# Patient Record
Sex: MALE | Race: WHITE | Employment: OTHER | URBAN - METROPOLITAN AREA
[De-identification: names, ages, dates, MRNs, and addresses within clinical notes are randomized per-mention and may not be internally consistent; named-entity substitution may affect disease eponyms.]

---

## 2022-01-01 ENCOUNTER — APPOINTMENT (OUTPATIENT)
Dept: GENERAL RADIOLOGY | Age: 67
End: 2022-01-01
Payer: COMMERCIAL

## 2022-01-01 ENCOUNTER — HOSPITAL ENCOUNTER (EMERGENCY)
Age: 67
Discharge: HOME | End: 2022-01-02
Attending: EMERGENCY MEDICINE
Payer: COMMERCIAL

## 2022-01-01 DIAGNOSIS — R10.13 ABDOMINAL PAIN, EPIGASTRIC: Primary | ICD-10-CM

## 2022-01-01 LAB
A/G RATIO: 2.1 (ref 1.1–2.2)
ALBUMIN SERPL-MCNC: 4.7 G/DL (ref 3.4–5)
ALP BLD-CCNC: 41 U/L (ref 40–129)
ALT SERPL-CCNC: 16 U/L (ref 10–40)
ANION GAP SERPL CALCULATED.3IONS-SCNC: 10 MMOL/L (ref 3–16)
AST SERPL-CCNC: 23 U/L (ref 15–37)
BASOPHILS ABSOLUTE: 0 K/UL (ref 0–0.2)
BASOPHILS RELATIVE PERCENT: 0.3 %
BILIRUB SERPL-MCNC: 0.3 MG/DL (ref 0–1)
BUN BLDV-MCNC: 14 MG/DL (ref 7–20)
CALCIUM SERPL-MCNC: 8.9 MG/DL (ref 8.3–10.6)
CHLORIDE BLD-SCNC: 101 MMOL/L (ref 99–110)
CO2: 26 MMOL/L (ref 21–32)
CREAT SERPL-MCNC: 0.9 MG/DL (ref 0.8–1.3)
EOSINOPHILS ABSOLUTE: 0.3 K/UL (ref 0–0.6)
EOSINOPHILS RELATIVE PERCENT: 4 %
GFR AFRICAN AMERICAN: >60
GFR NON-AFRICAN AMERICAN: >60
GLUCOSE BLD-MCNC: 125 MG/DL (ref 70–99)
HCT VFR BLD CALC: 44 % (ref 40.5–52.5)
HEMOGLOBIN: 15.5 G/DL (ref 13.5–17.5)
LIPASE: 28 U/L (ref 13–60)
LYMPHOCYTES ABSOLUTE: 2.7 K/UL (ref 1–5.1)
LYMPHOCYTES RELATIVE PERCENT: 33.5 %
MCH RBC QN AUTO: 29.6 PG (ref 26–34)
MCHC RBC AUTO-ENTMCNC: 35.3 G/DL (ref 31–36)
MCV RBC AUTO: 83.9 FL (ref 80–100)
MONOCYTES ABSOLUTE: 0.8 K/UL (ref 0–1.3)
MONOCYTES RELATIVE PERCENT: 10.5 %
NEUTROPHILS ABSOLUTE: 4.1 K/UL (ref 1.7–7.7)
NEUTROPHILS RELATIVE PERCENT: 51.7 %
PDW BLD-RTO: 14 % (ref 12.4–15.4)
PLATELET # BLD: 190 K/UL (ref 135–450)
PMV BLD AUTO: 7.7 FL (ref 5–10.5)
POTASSIUM REFLEX MAGNESIUM: 4.6 MMOL/L (ref 3.5–5.1)
PRO-BNP: 417 PG/ML (ref 0–124)
RBC # BLD: 5.24 M/UL (ref 4.2–5.9)
SODIUM BLD-SCNC: 137 MMOL/L (ref 136–145)
TOTAL PROTEIN: 6.9 G/DL (ref 6.4–8.2)
TROPONIN: <0.01 NG/ML
WBC # BLD: 8 K/UL (ref 4–11)

## 2022-01-01 PROCEDURE — 83880 ASSAY OF NATRIURETIC PEPTIDE: CPT

## 2022-01-01 PROCEDURE — 6360000002 HC RX W HCPCS: Performed by: EMERGENCY MEDICINE

## 2022-01-01 PROCEDURE — 6370000000 HC RX 637 (ALT 250 FOR IP): Performed by: EMERGENCY MEDICINE

## 2022-01-01 PROCEDURE — 80053 COMPREHEN METABOLIC PANEL: CPT

## 2022-01-01 PROCEDURE — 85025 COMPLETE CBC W/AUTO DIFF WBC: CPT

## 2022-01-01 PROCEDURE — 36415 COLL VENOUS BLD VENIPUNCTURE: CPT

## 2022-01-01 PROCEDURE — 71046 X-RAY EXAM CHEST 2 VIEWS: CPT

## 2022-01-01 PROCEDURE — 99283 EMERGENCY DEPT VISIT LOW MDM: CPT

## 2022-01-01 PROCEDURE — 83690 ASSAY OF LIPASE: CPT

## 2022-01-01 PROCEDURE — 93005 ELECTROCARDIOGRAM TRACING: CPT | Performed by: EMERGENCY MEDICINE

## 2022-01-01 PROCEDURE — 84484 ASSAY OF TROPONIN QUANT: CPT

## 2022-01-01 PROCEDURE — 96374 THER/PROPH/DIAG INJ IV PUSH: CPT

## 2022-01-01 RX ORDER — METOPROLOL SUCCINATE 25 MG/1
TABLET, EXTENDED RELEASE ORAL
COMMUNITY
Start: 2021-12-27

## 2022-01-01 RX ORDER — ONDANSETRON 2 MG/ML
4 INJECTION INTRAMUSCULAR; INTRAVENOUS ONCE
Status: COMPLETED | OUTPATIENT
Start: 2022-01-01 | End: 2022-01-01

## 2022-01-01 RX ORDER — ASPIRIN 81 MG/1
324 TABLET, CHEWABLE ORAL ONCE
Status: COMPLETED | OUTPATIENT
Start: 2022-01-01 | End: 2022-01-01

## 2022-01-01 RX ORDER — TADALAFIL 20 MG/1
20 TABLET ORAL DAILY PRN
COMMUNITY
Start: 2021-04-13

## 2022-01-01 RX ORDER — ASPIRIN 81 MG/1
81 TABLET ORAL DAILY
COMMUNITY

## 2022-01-01 RX ADMIN — ONDANSETRON 4 MG: 2 INJECTION INTRAMUSCULAR; INTRAVENOUS at 23:31

## 2022-01-01 RX ADMIN — ASPIRIN 324 MG: 81 TABLET, CHEWABLE ORAL at 23:09

## 2022-01-01 ASSESSMENT — PAIN SCALES - GENERAL: PAINLEVEL_OUTOF10: 6

## 2022-01-01 ASSESSMENT — PAIN DESCRIPTION - PAIN TYPE: TYPE: ACUTE PAIN

## 2022-01-01 ASSESSMENT — ENCOUNTER SYMPTOMS
ABDOMINAL PAIN: 1
SHORTNESS OF BREATH: 0
VOMITING: 0
CHEST TIGHTNESS: 0
RHINORRHEA: 0
FACIAL SWELLING: 0
DIARRHEA: 0
CHOKING: 0
EYE DISCHARGE: 0
NAUSEA: 0
EYE PAIN: 0

## 2022-01-01 ASSESSMENT — PAIN DESCRIPTION - LOCATION: LOCATION: CHEST

## 2022-01-02 ENCOUNTER — APPOINTMENT (OUTPATIENT)
Dept: CT IMAGING | Age: 67
End: 2022-01-02
Payer: COMMERCIAL

## 2022-01-02 VITALS
RESPIRATION RATE: 16 BRPM | HEIGHT: 76 IN | HEART RATE: 57 BPM | TEMPERATURE: 98.2 F | DIASTOLIC BLOOD PRESSURE: 84 MMHG | BODY MASS INDEX: 26.79 KG/M2 | OXYGEN SATURATION: 95 % | WEIGHT: 220 LBS | SYSTOLIC BLOOD PRESSURE: 149 MMHG

## 2022-01-02 LAB
EKG ATRIAL RATE: 62 BPM
EKG DIAGNOSIS: NORMAL
EKG P AXIS: 61 DEGREES
EKG P-R INTERVAL: 178 MS
EKG Q-T INTERVAL: 478 MS
EKG QRS DURATION: 88 MS
EKG QTC CALCULATION (BAZETT): 485 MS
EKG R AXIS: 60 DEGREES
EKG T AXIS: 155 DEGREES
EKG VENTRICULAR RATE: 62 BPM
TROPONIN: <0.01 NG/ML

## 2022-01-02 PROCEDURE — 36415 COLL VENOUS BLD VENIPUNCTURE: CPT

## 2022-01-02 PROCEDURE — 84484 ASSAY OF TROPONIN QUANT: CPT

## 2022-01-02 PROCEDURE — 71275 CT ANGIOGRAPHY CHEST: CPT

## 2022-01-02 PROCEDURE — 6370000000 HC RX 637 (ALT 250 FOR IP): Performed by: EMERGENCY MEDICINE

## 2022-01-02 PROCEDURE — 6360000004 HC RX CONTRAST MEDICATION: Performed by: EMERGENCY MEDICINE

## 2022-01-02 RX ORDER — OMEPRAZOLE 20 MG/1
20 CAPSULE, DELAYED RELEASE ORAL
Qty: 180 CAPSULE | Refills: 1 | Status: SHIPPED | OUTPATIENT
Start: 2022-01-02 | End: 2022-03-23 | Stop reason: ALTCHOICE

## 2022-01-02 RX ADMIN — LIDOCAINE HYDROCHLORIDE: 20 SOLUTION ORAL; TOPICAL at 00:20

## 2022-01-02 RX ADMIN — IOPAMIDOL 80 ML: 755 INJECTION, SOLUTION INTRAVENOUS at 01:08

## 2022-01-02 NOTE — ED TRIAGE NOTES
Pt began experiencing mid chest pain at rest. EKG complete upon pt arrival. Pt hooked up to tele monitoring. MD at the bedside upon pt arrival to room.

## 2022-01-02 NOTE — ED PROVIDER NOTES
[address] [facility]          ATTENDING PHYSICIAN NOTE       Date of evaluation: 1/1/2022    Chief Complaint     inDigestion/chest pain    History of Present Illness     Earnestine Whittington is a 74 y.o. male who presents with a chief complaint of chest pains. Patient states that about an hour and a half ago he had onset of an indigestion feeling in the middle of his chest/upper abdomen. Pain has been constant. Currently moderate, in his epigastrium, no radiation. He took some antacids but that did not help. He does have a history of hypertrophic cardiomyopathy but no history of heart attacks or stents, but given his history of heart problems and the fact that his pain was not going away with antacids he came in to be evaluated. Denies any exertional component, no specific alleviating or exacerbating factors, has never had pain like this before. No diaphoresis or shortness of breath. No history of gallbladder problems. No nausea or vomiting. Review of Systems     Review of Systems   Constitutional: Negative for activity change, appetite change, fatigue and fever. HENT: Negative for ear pain, facial swelling, nosebleeds and rhinorrhea. Eyes: Negative for pain, discharge and visual disturbance. Respiratory: Negative for choking, chest tightness and shortness of breath. Cardiovascular: Positive for chest pain. Negative for palpitations. Gastrointestinal: Positive for abdominal pain. Negative for diarrhea, nausea and vomiting. Endocrine: Negative for cold intolerance, heat intolerance and polyuria. Genitourinary: Negative for dysuria, flank pain and hematuria. Musculoskeletal: Negative for arthralgias, joint swelling and myalgias. Skin: Negative for rash and wound. Allergic/Immunologic: Negative for environmental allergies. Neurological: Negative for dizziness, seizures, syncope, weakness and light-headedness.    Hematological: Does not bruise/bleed easily. Psychiatric/Behavioral: Negative for confusion and hallucinations. Past Medical, Surgical, Family, and Social History     He has a past medical history of Hypertrophic cardiomegaly. He has no past surgical history on file. His family history is not on file. He reports that he has never smoked. He has never used smokeless tobacco. He reports current alcohol use. He reports that he does not use drugs. Medications     Previous Medications    ASPIRIN 81 MG EC TABLET    Take 81 mg by mouth daily    METOPROLOL SUCCINATE (TOPROL XL) 25 MG EXTENDED RELEASE TABLET    TAKE ONE TABLET BY MOUTH DAILY    TADALAFIL (CIALIS) 20 MG TABLET    Take 20 mg by mouth daily as needed       Allergies     He has No Known Allergies. Physical Exam     INITIAL VITALS: BP: (!) 199/101, Temp: 98.2 °F (36.8 °C), Pulse: 58, Resp: 16, SpO2: 100 %   Physical Exam  Constitutional:       General: He is not in acute distress. Appearance: He is well-developed. He is not diaphoretic. HENT:      Head: Normocephalic and atraumatic. Mouth/Throat:      Pharynx: No oropharyngeal exudate. Eyes:      General:         Right eye: No discharge. Left eye: No discharge. Conjunctiva/sclera: Conjunctivae normal.      Pupils: Pupils are equal, round, and reactive to light. Neck:      Thyroid: No thyromegaly. Vascular: No JVD. Trachea: No tracheal deviation. Cardiovascular:      Rate and Rhythm: Normal rate and regular rhythm. Heart sounds: Normal heart sounds. No murmur heard. No friction rub. No gallop. Pulmonary:      Effort: Pulmonary effort is normal. No respiratory distress. Breath sounds: Normal breath sounds. No stridor. No wheezing or rales. Chest:      Chest wall: No tenderness. Abdominal:      General: Bowel sounds are normal. There is no distension. Palpations: Abdomen is soft. Tenderness: There is abdominal tenderness (Epigastric tenderness to palpation).  There is no guarding or rebound. Musculoskeletal:         General: No tenderness or deformity. Normal range of motion. Cervical back: Normal range of motion and neck supple. Skin:     General: Skin is warm and dry. Findings: No erythema or rash. Neurological:      Mental Status: He is alert and oriented to person, place, and time. Cranial Nerves: No cranial nerve deficit. Motor: No abnormal muscle tone. Coordination: Coordination normal.   Psychiatric:         Behavior: Behavior normal.         Diagnostic Results     EKG   Patient: Chest pain. Heart rate 62, intervals normal, axis normal.  No signs of ST elevation or depression but diffuse T wave inversions throughout several leads, although I cannot see prior EKGs I can see prior EKG reads which do state the patient has had diffuse T wave inversions before. Impression: Normal sinus rhythm with likely baseline T wave inversions. Per cardiology note from [facility] : ECG May 2017: NSR, diffuse T wave inversions and increased voltage in mid-precordial leads. RADIOLOGY:  CTA CHEST ABDOMEN PELVIS W CONTRAST   Final Result   1. The thoracic aorta is nondilated. There is no aneurysm or    dissection. 2.  The pulmonary arterial tree is well-opacified with contrast.     No pulmonary embolism is identified. 3.  Trace amount of bibasilar subsegmental atelectasis or scarring,    blurred by motion. No acute appearing airspace infiltrate is    identified. _______________________________________________       IMPRESSION:        1. The abdominal aorta is nondilated. There is no aneurysm or    dissection. 2.  Bowel loops are nondilated. The appendix is normal.  There is    diverticulosis of the lower left and sigmoid colon. No acute    inflammatory changes are seen involving the bowel. 3.  The gallbladder is distended with mildly thickened wall. There are gallstones in the dependent portion of the gallbladder. There is concern for acute cholecystitis, consider ultrasound for    further characterization. XR CHEST (2 VW)   Final Result   1. No acute disease.           LABS:   Results for orders placed or performed during the hospital encounter of 01/01/22   CBC Auto Differential   Result Value Ref Range    WBC 8.0 4.0 - 11.0 K/uL    RBC 5.24 4.20 - 5.90 M/uL    Hemoglobin 15.5 13.5 - 17.5 g/dL    Hematocrit 44.0 40.5 - 52.5 %    MCV 83.9 80.0 - 100.0 fL    MCH 29.6 26.0 - 34.0 pg    MCHC 35.3 31.0 - 36.0 g/dL    RDW 14.0 12.4 - 15.4 %    Platelets 589 510 - 786 K/uL    MPV 7.7 5.0 - 10.5 fL    Neutrophils % 51.7 %    Lymphocytes % 33.5 %    Monocytes % 10.5 %    Eosinophils % 4.0 %    Basophils % 0.3 %    Neutrophils Absolute 4.1 1.7 - 7.7 K/uL    Lymphocytes Absolute 2.7 1.0 - 5.1 K/uL    Monocytes Absolute 0.8 0.0 - 1.3 K/uL    Eosinophils Absolute 0.3 0.0 - 0.6 K/uL    Basophils Absolute 0.0 0.0 - 0.2 K/uL   Comprehensive Metabolic Panel w/ Reflex to MG   Result Value Ref Range    Sodium 137 136 - 145 mmol/L    Potassium reflex Magnesium 4.6 3.5 - 5.1 mmol/L    Chloride 101 99 - 110 mmol/L    CO2 26 21 - 32 mmol/L    Anion Gap 10 3 - 16    Glucose 125 (H) 70 - 99 mg/dL    BUN 14 7 - 20 mg/dL    CREATININE 0.9 0.8 - 1.3 mg/dL    GFR Non-African American >60 >60    GFR African American >60 >60    Calcium 8.9 8.3 - 10.6 mg/dL    Total Protein 6.9 6.4 - 8.2 g/dL    Albumin 4.7 3.4 - 5.0 g/dL    Albumin/Globulin Ratio 2.1 1.1 - 2.2    Total Bilirubin 0.3 0.0 - 1.0 mg/dL    Alkaline Phosphatase 41 40 - 129 U/L    ALT 16 10 - 40 U/L    AST 23 15 - 37 U/L   Lipase   Result Value Ref Range    Lipase 28.0 13.0 - 60.0 U/L   Troponin   Result Value Ref Range    Troponin <0.01 <0.01 ng/mL   Brain Natriuretic Peptide   Result Value Ref Range    Pro- (H) 0 - 124 pg/mL   Troponin   Result Value Ref Range    Troponin <0.01 <0.01 ng/mL       ED BEDSIDE ULTRASOUND:  none    RECENT VITALS:  BP: (!) 149/84,Temp: 98.2 °F (36.8 °C), Pulse: 57, Resp: 16, SpO2: 95 %     Procedures     none    ED Course     Nursing Notes, Past Medical Hx, Past Surgical Hx, Social Hx,Allergies, and Family Hx were reviewed. patient was given the following medications:  Orders Placed This Encounter   Medications    aspirin chewable tablet 324 mg    aluminum & magnesium hydroxide-simethicone (MAALOX) 30 mL, lidocaine viscous hcl (XYLOCAINE) 5 mL (GI COCKTAIL)    ondansetron (ZOFRAN) injection 4 mg    iopamidol (ISOVUE-370) 76 % injection 80 mL    omeprazole (PRILOSEC) 20 MG delayed release capsule     Sig: Take 1 capsule by mouth 2 times daily (before meals)     Dispense:  180 capsule     Refill:  1       CONSULTS:  None    MEDICAL DECISIONMAKING / ASSESSMENT / Matheus Juan Luis is a 74 y.o. male who presents with a chief complaint of chest pain. Initial exam reveals a well-appearing male in no acute distress with hypertension but otherwise normal vitals, afebrile. Physical exam remarkable for mild epigastric tenderness to palpation. Patient with pain in the location that could be caused by heart problems, GERD, gallbladder, pancreas. Broad work-up initiated. EKG with diffuse T wave inversions but on chart review it appears this is likely old based on care everywhere, and patient agrees that he has been told he has T wave inversions before. He even told me before we got the EKG that he \"has a weird EKG \". Troponins negative x2. Patient with full resolution of chest pain after aspirin and GI cocktail. Labs otherwise normal with normal LFTs and lipase, normal white count, normal renal panel. I did obtain a CTA of the chest abdomen pelvis to rule out aortic disease given patient has never had symptoms like this before and he was incredibly hypertensive on arrival.  This was unremarkable other than incidental gallstones. Bedside ultrasound was recommended.   On my bedside ultrasound patient has stones but no signs of cholecystitis. Patient can follow-up with outpatient surgery clinic to discuss if he needs his gallbladder removed. I still think the cause of his symptoms today was acid reflux. He states he has a history of acid reflux. Patient will be discharged with omeprazole and can follow-up with primary care doctor and his cardiologist for outpatient stress test.      Clinical Impression     1. Abdominal pain, epigastric        Disposition     PATIENT REFERRED TO:  Lise Lizarraga, [address] [id number] [phone]    In 1 week  for ED follow up visit    Greg Nichols MD  [address]  [address] [address]  [phone]    In 1 week  for ED follow up visit for gallbladder stones      DISCHARGE MEDICATIONS:  New Prescriptions    OMEPRAZOLE (PRILOSEC) 20 MG DELAYED RELEASE CAPSULE    Take 1 capsule by mouth 2 times daily (before meals)       DISPOSITION discharge home       Frank Matos MD  01/02/22 [id number]

## 2022-01-02 NOTE — ED NOTES
Pt has d/c order. D/C instructions given. Prescriptions given. Pt verbalized understanding. Pt out to lobby.          Shelli Sandoval RN  01/02/22 [id number]

## 2022-03-23 ENCOUNTER — HOSPITAL ENCOUNTER (EMERGENCY)
Age: 67
Discharge: HOME | End: 2022-03-23
Attending: EMERGENCY MEDICINE
Payer: MEDICARE

## 2022-03-23 VITALS
SYSTOLIC BLOOD PRESSURE: 154 MMHG | TEMPERATURE: 98 F | RESPIRATION RATE: 16 BRPM | OXYGEN SATURATION: 99 % | DIASTOLIC BLOOD PRESSURE: 89 MMHG | HEART RATE: 61 BPM

## 2022-03-23 DIAGNOSIS — K40.91 UNILATERAL RECURRENT INGUINAL HERNIA WITHOUT OBSTRUCTION OR GANGRENE: Primary | ICD-10-CM

## 2022-03-23 PROCEDURE — 99284 EMERGENCY DEPT VISIT MOD MDM: CPT

## 2022-03-23 ASSESSMENT — PAIN DESCRIPTION - ORIENTATION: ORIENTATION: LEFT;LOWER

## 2022-03-23 ASSESSMENT — PAIN SCALES - GENERAL: PAINLEVEL_OUTOF10: 4

## 2022-03-23 ASSESSMENT — PAIN DESCRIPTION - FREQUENCY: FREQUENCY: CONTINUOUS

## 2022-03-23 ASSESSMENT — PAIN - FUNCTIONAL ASSESSMENT
PAIN_FUNCTIONAL_ASSESSMENT: ACTIVITIES ARE NOT PREVENTED
PAIN_FUNCTIONAL_ASSESSMENT: 0-10

## 2022-03-23 ASSESSMENT — PAIN DESCRIPTION - PAIN TYPE: TYPE: ACUTE PAIN

## 2022-03-23 ASSESSMENT — PAIN DESCRIPTION - DESCRIPTORS: DESCRIPTORS: TENDER

## 2022-03-23 ASSESSMENT — PAIN DESCRIPTION - LOCATION: LOCATION: GROIN;ABDOMEN

## 2022-03-23 ASSESSMENT — PAIN DESCRIPTION - ONSET: ONSET: SUDDEN

## 2022-03-23 ASSESSMENT — PAIN DESCRIPTION - PROGRESSION: CLINICAL_PROGRESSION: GRADUALLY IMPROVING

## 2022-03-23 NOTE — ED PROVIDER NOTES
ED Attending Attestation Note     Date of evaluation: 3/23/2022    This patient was seen by the advance practice provider. I have seen and examined the patient, agree with the workup, evaluation, management and diagnosis. The care plan has been discussed. My assessment reveals patient with left inguinal hernia that spontaneously reduced after passing flatus. Benign exam, will refer to general surgeon.      Jennifer Talbert MD  03/23/22 1021

## 2022-03-23 NOTE — ED PROVIDER NOTES
[address] ENCOUNTER          PHYSICIAN ASSISTANT NOTE     Date of evaluation: 3/23/2022    Chief Complaint     Groin Pain (left inguinal hernia)      History of Present Illness     Teddy Fonseca is a 74 y.o. male with a history of hypertrophic cardiomegaly presents today for evaluation of left inguinal pain and swelling. Patient states he was tentatively diagnosed with an inguinal hernia last week by his primary care doctor. This morning, he awoke and was making coffee when he felt a bulging sensation in his left groin with some associated sharp pain. The pain was initially rated 8/10 and associated nausea. He he endorsed a severe accompanying sensation of the need to pass gas or have a bowel movement. He considered his return precautions provided by his primary care doctor for possible incarcerated/strangulated hernia and presents here for evaluation of such. While in the lobby, patient states he passed gas, the bulge significantly decreased in size, and his pain is now minimal.  He specifically denies any upper abdominal pain, swelling of the testicles, pain in the scrotum, pain with defecation, bloody defecation, bloody urine, burning with urination, penile discharge, constipation. He denies any history of abdominal surgeries. Review of Systems     A complete review of systems was performed and negative except as stated in the HPI. Past Medical, Surgical, Family, and Social History     He has a past medical history of Hypertrophic cardiomegaly and Inguinal hernia. He has no past surgical history on file. His family history is not on file. He reports that he has never smoked. He has never used smokeless tobacco. He reports current alcohol use. He reports that he does not use drugs.     Medications     Previous Medications    ASPIRIN 81 MG EC TABLET    Take 81 mg by mouth daily    METOPROLOL SUCCINATE (TOPROL XL) 25 MG EXTENDED RELEASE TABLET    TAKE ONE TABLET BY MOUTH DAILY    TADALAFIL (CIALIS) 20 MG TABLET    Take 20 mg by mouth daily as needed       Allergies     He is allergic to atorvastatin. Physical Exam     INITIAL VITALS: BP: (!) 154/89, Temp: 98 °F (36.7 °C), Pulse: 61, Resp: 16, SpO2: 99 %     General: Well appearing, well nourished, in no apparent state of distress. HEENT:  Normocephalic, atraumatic. Pupils equal, sclera white. Handling secretions without difficulty. Neck: No meningismus. Trachea midline    Pulmonary: Respirations even. Non labored. No tachypnea. Cardiac: Chest symmetrical and non-tender on palpation of chest wall. Abdomen:  Non-distended. Non rigid and non tender to palpation. There is slight fullness overlying the left inguinal canal without palpable bowel or reducible hernia. : No pain, swelling, or palpable bowel loop in the scrotum. Testicles are equal in size and in equal lie. Musculoskeletal:  Ambulates under own control. Neuro:  Alert and oriented x 3. CN II - XII grossly intact. Moves all extremities spontaneously. Vascular:  2+ peripheral pulses in bilateral upper and lower extremities      Skin:  Warm and well perfused without rashes or lesions    Psych:  Appropriate mood and affect        Diagnostic Results     EKG       RADIOLOGY:  No orders to display       LABS:   No results found for this visit on 03/23/22. ED BEDSIDE ULTRASOUND:      RECENT VITALS:  BP: (!) 154/89, Temp: 98 °F (36.7 °C), Pulse: 61, Resp: 16, SpO2: 99 %     Procedures         ED Course     Nursing Notes, Past Medical Hx, Past Surgical Hx, Social Hx, Allergies, and Family Hx were reviewed. The patient was given the following medications:  No orders of the defined types were placed in this encounter.       CONSULTS:  None    MEDICAL DECISION MAKING / ASSESSMENT / PLAN     Zakia Deal is a 74 y.o. male presents today for evaluation of recurrent left-sided inguinal pain for which he is concerned about a possible strangulated or incarcerated hernia. The pain resolved spontaneously while waiting in the lobby and passing flatulence. On arrival, I observe a well-appearing, pleasant, humorous male. His abdomen is soft without rebound tenderness. There is palpable fullness over the left inguinal canal without palpable bowel loop or reducible hernia. No swelling or tenderness of the testicles/scrotum. Based on his symptoms he likely has a spontaneously reducing inguinal hernia. Today, it does not appear to be incarcerated or strangulated considering his now lack of pain. I have a low suspicion for accompanying testicular torsion, varicocele, hydrocele, epididymitis, prostatitis, urethritis, urinary tract infection, diverticulitis, bowel obstruction, or appendicitis. He was instructed on strict return precautions should this recur and become associated with intractable pain. A referral to general surgery was placed. He was discharged in good condition. This patient was also evaluated by the attending physician. All care plans were discussed and agreed upon. Some of this note was dictated using voice recognition software. As a result, unintended errors in grammar or spelling may exist.    Clinical Impression     1. Unilateral recurrent inguinal hernia without obstruction or gangrene        Disposition     PATIENT REFERRED TO:  Jv Lea MD  [address]  [address] [place] [address]  [phone]    Schedule an appointment as soon as possible for a visit       Jammie Hebert MD  [address] [facility] [id number] [phone]    Schedule an appointment as soon as possible for a visit   As needed      DISCHARGE MEDICATIONS:  New Prescriptions    No medications on file       DISPOSITION Decision To Discharge 03/23/2022 10:19:11 AM        Mali Sherwood PA-C  03/23/22 [id number]

## 2022-03-25 ENCOUNTER — HOSPITAL ENCOUNTER (EMERGENCY)
Age: 67
Discharge: HOME | End: 2022-03-25
Attending: STUDENT IN AN ORGANIZED HEALTH CARE EDUCATION/TRAINING PROGRAM
Payer: MEDICARE

## 2022-03-25 VITALS
OXYGEN SATURATION: 100 % | SYSTOLIC BLOOD PRESSURE: 147 MMHG | TEMPERATURE: 98.7 F | HEART RATE: 55 BPM | DIASTOLIC BLOOD PRESSURE: 85 MMHG | RESPIRATION RATE: 16 BRPM

## 2022-03-25 DIAGNOSIS — K40.91 UNILATERAL RECURRENT INGUINAL HERNIA WITHOUT OBSTRUCTION OR GANGRENE: Primary | ICD-10-CM

## 2022-03-25 LAB
ALBUMIN SERPL-MCNC: 4.4 G/DL (ref 3.4–5)
ALP BLD-CCNC: 37 U/L (ref 40–129)
ALT SERPL-CCNC: 17 U/L (ref 10–40)
ANION GAP SERPL CALCULATED.3IONS-SCNC: 9 MMOL/L (ref 3–16)
AST SERPL-CCNC: 18 U/L (ref 15–37)
BASOPHILS ABSOLUTE: 0.1 K/UL (ref 0–0.2)
BASOPHILS RELATIVE PERCENT: 1.1 %
BILIRUB SERPL-MCNC: 0.8 MG/DL (ref 0–1)
BILIRUBIN DIRECT: <0.2 MG/DL (ref 0–0.3)
BILIRUBIN URINE: NEGATIVE
BILIRUBIN, INDIRECT: ABNORMAL MG/DL (ref 0–1)
BLOOD, URINE: NEGATIVE
BUN BLDV-MCNC: 14 MG/DL (ref 7–20)
CALCIUM SERPL-MCNC: 9.3 MG/DL (ref 8.3–10.6)
CHLORIDE BLD-SCNC: 101 MMOL/L (ref 99–110)
CLARITY: CLEAR
CO2: 27 MMOL/L (ref 21–32)
COLOR: YELLOW
CREAT SERPL-MCNC: 0.9 MG/DL (ref 0.8–1.3)
EOSINOPHILS ABSOLUTE: 0.4 K/UL (ref 0–0.6)
EOSINOPHILS RELATIVE PERCENT: 5 %
GFR AFRICAN AMERICAN: >60
GFR NON-AFRICAN AMERICAN: >60
GLUCOSE BLD-MCNC: 132 MG/DL (ref 70–99)
GLUCOSE URINE: NEGATIVE MG/DL
HCT VFR BLD CALC: 42.8 % (ref 40.5–52.5)
HEMOGLOBIN: 15 G/DL (ref 13.5–17.5)
KETONES, URINE: NEGATIVE MG/DL
LACTIC ACID: 1.1 MMOL/L (ref 0.4–2)
LEUKOCYTE ESTERASE, URINE: ABNORMAL
LIPASE: 18 U/L (ref 13–60)
LYMPHOCYTES ABSOLUTE: 1.7 K/UL (ref 1–5.1)
LYMPHOCYTES RELATIVE PERCENT: 21.1 %
MCH RBC QN AUTO: 29.6 PG (ref 26–34)
MCHC RBC AUTO-ENTMCNC: 35.1 G/DL (ref 31–36)
MCV RBC AUTO: 84.4 FL (ref 80–100)
MICROSCOPIC EXAMINATION: YES
MONOCYTES ABSOLUTE: 0.7 K/UL (ref 0–1.3)
MONOCYTES RELATIVE PERCENT: 8.5 %
MUCUS: ABNORMAL /LPF
NEUTROPHILS ABSOLUTE: 5.3 K/UL (ref 1.7–7.7)
NEUTROPHILS RELATIVE PERCENT: 64.3 %
NITRITE, URINE: NEGATIVE
PDW BLD-RTO: 14.2 % (ref 12.4–15.4)
PH UA: 6 (ref 5–8)
PLATELET # BLD: 184 K/UL (ref 135–450)
PMV BLD AUTO: 7.8 FL (ref 5–10.5)
POTASSIUM REFLEX MAGNESIUM: 4.3 MMOL/L (ref 3.5–5.1)
PROTEIN UA: NEGATIVE MG/DL
RBC # BLD: 5.08 M/UL (ref 4.2–5.9)
RBC UA: ABNORMAL /HPF (ref 0–4)
SODIUM BLD-SCNC: 137 MMOL/L (ref 136–145)
SPECIFIC GRAVITY UA: >=1.03 (ref 1–1.03)
TOTAL PROTEIN: 7 G/DL (ref 6.4–8.2)
URINE TYPE: ABNORMAL
UROBILINOGEN, URINE: 0.2 E.U./DL
WBC # BLD: 8.2 K/UL (ref 4–11)
WBC UA: ABNORMAL /HPF (ref 0–5)

## 2022-03-25 PROCEDURE — 85025 COMPLETE CBC W/AUTO DIFF WBC: CPT

## 2022-03-25 PROCEDURE — 81001 URINALYSIS AUTO W/SCOPE: CPT

## 2022-03-25 PROCEDURE — 80076 HEPATIC FUNCTION PANEL: CPT

## 2022-03-25 PROCEDURE — 80048 BASIC METABOLIC PNL TOTAL CA: CPT

## 2022-03-25 PROCEDURE — 83605 ASSAY OF LACTIC ACID: CPT

## 2022-03-25 PROCEDURE — 99282 EMERGENCY DEPT VISIT SF MDM: CPT

## 2022-03-25 PROCEDURE — 83690 ASSAY OF LIPASE: CPT

## 2022-03-25 ASSESSMENT — PAIN DESCRIPTION - FREQUENCY: FREQUENCY: CONTINUOUS

## 2022-03-25 ASSESSMENT — PAIN DESCRIPTION - DESCRIPTORS: DESCRIPTORS: BURNING

## 2022-03-25 ASSESSMENT — PAIN SCALES - GENERAL: PAINLEVEL_OUTOF10: 2

## 2022-03-25 ASSESSMENT — PAIN DESCRIPTION - ORIENTATION: ORIENTATION: LEFT

## 2022-03-25 ASSESSMENT — PAIN DESCRIPTION - LOCATION: LOCATION: GROIN

## 2022-03-25 ASSESSMENT — PAIN DESCRIPTION - PAIN TYPE: TYPE: ACUTE PAIN

## 2022-03-25 NOTE — ED NOTES
Pt given dc instructions, verbalized understanding. Pt educated on follow up appt, verbalized understanding. Pt has no further questions or concerns at this time. Pt in no signs of distress. Pt has steady gait to lobby.         Theodore Davis RN  03/25/22 4753

## 2022-03-25 NOTE — ED PROVIDER NOTES
[address] [facility]          ATTENDING PHYSICIAN NOTE       Date of evaluation: 3/25/2022    Chief Complaint     Groin Pain (left groin pain/swelling at hernia site appt on monday with dr Estephania Hodges)      History of Present Illness     Claudia Ontiveros is a 74 y.o. male with a hx inguinal hernia, hypertrophic cardiomegaly who presents with groin pain    Patient was recently seen in this emergency department approximately 2 days ago. At that time he presented with concern for left groin fullness that resolved at the time he was evaluated by the providers. Given this there was concern for a spontaneously reducing hernia without indication for further emergent or urgent evaluation. Today, the patient returns with recurrent pain    Patient awoke today pain free. He began his usual daily activities. Then, he noticed pain. Pain is described as in the left groin region, moderate in severity, burning in quality, constant in its course since onset about 2 hours ago for about 90 minutes, and then much improved over last 10-15 minutes since lying in the stretcher in a semi-holm's position. When had the pain, it was worsened by palpation or movement. He did not notice warmth or redness to the area but could palpate a fullness in the left groin. He denies any dysuria, hematuria, urinary frequency. He has not had any radiation of the pain to the scrotum or any scrotal pain. He had a bowel movement yesterday which was normal typical in appearance from although it is slightly light in color compared to typical.  Certainly no melena or hematochezia. He had some mild nausea this morning associated with the pain but no vomiting. The nausea is completely resolved. He has otherwise been in his usual state of health and denies any recent fevers. PMHx: HCM, inguinal hernia, and as below  SH: , nonsmoker, and as below    Review of Systems       ROS:   Positive  as per HPI.   Negative for:    -Constitutional: fevers, chills    -Eyes:   eye pain, eye discharge    -Ears/Nose/Throat: Ear pain, ear discharge    -Cardiovascular: CP, cyanosis    -Respiratory:  cough, SOB    -Gastrointestinal: vomiting, melena, hematochezia    -Genitourinary: hematuria, dysuria, urinary frequency    -Neurological: numbness or weakness    -Skin:   Rash, pruritis,     -Hematologic: easy bleeding, easy bruising    -Musculoskeletal:  joint swelling, joint redness    Past Medical, Surgical, Family, and Social History     He has a past medical history of Hypertrophic cardiomegaly and Inguinal hernia. He has no past surgical history on file. His family history is not on file. He reports that he has never smoked. He has never used smokeless tobacco. He reports current alcohol use. He reports that he does not use drugs. Medications     Discharge Medication List as of 3/25/2022 12:16 PM      CONTINUE these medications which have NOT CHANGED    Details   metoprolol succinate (TOPROL XL) 25 MG extended release tablet TAKE ONE TABLET BY MOUTH DAILYHistorical Med      aspirin 81 MG EC tablet Take 81 mg by mouth dailyHistorical Med      tadalafil (CIALIS) 20 MG tablet Take 20 mg by mouth daily as neededHistorical Med             Allergies     He is allergic to atorvastatin. Physical Exam     INITIAL VITALS: BP: (!) 147/85, Temp: 98.7 °F (37.1 °C), Pulse: 55, Resp: 16, SpO2: 100 %     General:  Well appearing. No acute distress. Non-toxic appearing    Eyes:  Pupils equally round, reactive, brisk. No discharge from eyes. ENT:  No discharge from nose. OP clear. Neck:  Supple. Nontender. Pulmonary:   Non-labored breathing. Breath sounds clear bilaterally. Cardiac:  Regular rate and rhythm. No murmurs. Abdomen:  Soft. Non-tender throughout. Non-distended. No masses. No CVAT. : Normal external male genitalia. The scrotum is soft and free of tenderness palpation. Testes are palpable with normal lie.   The testes are nontender. There is no overlying scrotal edema or skin thickening. There is no warmth or erythema. The entire groin is free of any rash or warmth. There is no lymphadenopathy palpable. Patient has no palpable hernia or fullness in the left groin. Musculoskeletal:  No long bone deformity. No ankle or wrist deformity. Vascular:  Extremities warm and perfused. Skin:  No rash. Warm. Neuro: Alert and oriented x 3. CN II-XII grossly intact. Speech and mentation normal.    5/5 strength in all 4 extremities by finger  and ankle dorsi/plantar flexion. Sensation grossly intact to light touch. Gait narrow and stable, not ataxic. Extremities:  No peripheral edema. LE symmetric.     Diagnostic Results     EKG   None indicated    RADIOLOGY:  No orders to display       LABS:   Results for orders placed or performed during the hospital encounter of 03/25/22   CBC with Auto Differential   Result Value Ref Range    WBC 8.2 4.0 - 11.0 K/uL    RBC 5.08 4.20 - 5.90 M/uL    Hemoglobin 15.0 13.5 - 17.5 g/dL    Hematocrit 42.8 40.5 - 52.5 %    MCV 84.4 80.0 - 100.0 fL    MCH 29.6 26.0 - 34.0 pg    MCHC 35.1 31.0 - 36.0 g/dL    RDW 14.2 12.4 - 15.4 %    Platelets 250 159 - 983 K/uL    MPV 7.8 5.0 - 10.5 fL    Neutrophils % 64.3 %    Lymphocytes % 21.1 %    Monocytes % 8.5 %    Eosinophils % 5.0 %    Basophils % 1.1 %    Neutrophils Absolute 5.3 1.7 - 7.7 K/uL    Lymphocytes Absolute 1.7 1.0 - 5.1 K/uL    Monocytes Absolute 0.7 0.0 - 1.3 K/uL    Eosinophils Absolute 0.4 0.0 - 0.6 K/uL    Basophils Absolute 0.1 0.0 - 0.2 K/uL   Basic Metabolic Panel w/ Reflex to MG   Result Value Ref Range    Sodium 137 136 - 145 mmol/L    Potassium reflex Magnesium 4.3 3.5 - 5.1 mmol/L    Chloride 101 99 - 110 mmol/L    CO2 27 21 - 32 mmol/L    Anion Gap 9 3 - 16    Glucose 132 (H) 70 - 99 mg/dL    BUN 14 7 - 20 mg/dL    CREATININE 0.9 0.8 - 1.3 mg/dL    GFR Non-African American >60 >60    GFR African American >60 >60    Calcium 9.3 8.3 - 10.6 mg/dL   Hepatic Function Panel   Result Value Ref Range    Total Protein 7.0 6.4 - 8.2 g/dL    Albumin 4.4 3.4 - 5.0 g/dL    Alkaline Phosphatase 37 (L) 40 - 129 U/L    ALT 17 10 - 40 U/L    AST 18 15 - 37 U/L    Total Bilirubin 0.8 0.0 - 1.0 mg/dL    Bilirubin, Direct <0.2 0.0 - 0.3 mg/dL    Bilirubin, Indirect see below 0.0 - 1.0 mg/dL   Lipase   Result Value Ref Range    Lipase 18.0 13.0 - 60.0 U/L   Urinalysis with Microscopic   Result Value Ref Range    Color, UA Yellow Straw/Yellow    Clarity, UA Clear Clear    Glucose, Ur Negative Negative mg/dL    Bilirubin Urine Negative Negative    Ketones, Urine Negative Negative mg/dL    Specific Gravity, UA >=1.030 1.005 - 1.030    Blood, Urine Negative Negative    pH, UA 6.0 5.0 - 8.0    Protein, UA Negative Negative mg/dL    Urobilinogen, Urine 0.2 <2.0 E.U./dL    Nitrite, Urine Negative Negative    Leukocyte Esterase, Urine TRACE (A) Negative    Microscopic Examination YES     Urine Type NotGiven     Mucus, UA 1+ (A) None Seen /LPF    WBC, UA 0-2 0 - 5 /HPF    RBC, UA None seen 0 - 4 /HPF   Lactic Acid   Result Value Ref Range    Lactic Acid 1.1 0.4 - 2.0 mmol/L       ED BEDSIDE ULTRASOUND:  None performed    Procedures     None performed    ED Course     Nursing Notes, Past Medical Hx, Past Surgical Hx, Social Hx, Allergies, and Family Hx were reviewed. The patient was given the following medications:  No orders of the defined types were placed in this encounter. CONSULTS:  [address] [facility] DECISIONMAKING / ASSESSMENT / PLAN     Macie Velazquez is a 74 y.o. male with groin pain. Pt was hemodynamically stable and afebrile in the Emergency Department. The patient presents with pain that appears to be likely related to a spontaneously reducible hernia. He is pain-free on my exam.  Given this I have a lower suspicion for incarcerated or strangulated hernia.   His testicles ands scrotum are unremarkable in appearance and a low suspicion for testicular torsion and certainly a very low suspicion for necrotizing fasciitis. There is no sign of lymphadenopathy. There are no signs of epididymitis. His signs and symptoms do not suggest urethritis urinary tract infection, pyelonephritis, ureterolithiasis. Given the left-sided pain at a lower suspicion for appendicitis. Given he is passing stools and has not been vomiting and is passing gas, I have a low suspicion for bowel obstruction. Given the left-sided pain I did consider in the differential diverticulitis. However, the patient has been afebrile and his left lower quadrant is nontender to palpation today. Given his recurrent visit however, I will obtain screening laboratory studies. These returned reassuring. CBC with no leukocytosis, making systemic infection somewhat less likely. There is no significant new anemia. Basic metabolic panel without evidence of acute kidney injury or significant electrolyte derangement. Hepatic function panel unremarkable making acute hepatitis or biliary pathology less likely. Lipase is not elevated making pancreatitis unlikely. Urinalysis unremarkable and free of evidence of acute urinary tract infection. Lactate not elevated making systemic infection and hypoperfusion somewhat less likely. Discussed with the patient who requested consideration for possible more urgent surgical intervention given the recurrent pain episode even though it has now resolved. I explained that in the absence of ongoing symptoms he was unlikely to have an incarcerated or strangled hernia and was suitable for outpatient follow-up but agreed that given his recurrent ED visit we would touch base with surgery. Surgery evaluated the patient and recommended outpatient follow-up which she already has arranged. Patient was agreeable with this plan. At this time patient be discharged. Clinical Impression     1.  Unilateral recurrent inguinal hernia without obstruction or gangrene        Disposition     PATIENT REFERRED TO:  Salomon Jackson MD  [address] [facility] [address] [phone]    Schedule an appointment as soon as possible for a visit   As needed    Damari Romero MD  [address] [address] [address]  [phone]    Go on 3/28/2022  Discuss your ED visit, and referrals/medication      DISCHARGE MEDICATIONS:  Discharge Medication List as of 3/25/2022 12:16 PM          DISPOSITION    Decision to discharge.      Kathi Osman MD  03/25/22 [id number]

## 2022-03-25 NOTE — CONSULTS
Department of General Surgery - Adult  Surgical Service   Consult Note      Reason for Consult:  Inguinal hernia  Requesting Physician: Dr. Meggan Taylor:  Groin pain    History Obtained From:  patient, electronic medical record    HISTORY OF PRESENT ILLNESS:    The patient is a 74 y.o. male who presents with left inguinal pain and swelling. The first episode was on Wednesday and he presented to the ER. He was seen and instructed to follow up with surgery. The first available appointment was on 3/28. He was doing fine and then this morning he woke up and the pain and swelling restarted. This time the pain was worse and he had some nausea with it. His last meal was last night. He denies fever, chills, SOB, emesis. He currently feels better. He no longer is having any symptoms. Past Medical History:        Diagnosis Date    Hypertrophic cardiomegaly     Inguinal hernia     left     Past Surgical History:    No past surgical history on file. Current Medications:   No current facility-administered medications for this encounter. Allergies:  Atorvastatin    Social History:   TOBACCO:  Never used tobacco  ETOH:  social  DRUGS:  Never used recreational drugs  Family History:   No family history on file.     REVIEW OF SYSTEMS:    CONSTITUTIONAL:  negative for  fevers, chills and sweats  RESPIRATORY:  negative for  dyspnea, wheezing and chest pain  CARDIOVASCULAR:  negative for  chest pain, dyspnea, palpitations  GASTROINTESTINAL:  positive for nausea, negative for emesis  GENITOURINARY:  Left groin pain and swelling    PHYSICAL EXAM:    VITALS:  BP (!) 147/85   Pulse 55   Resp 16   SpO2 100%   24HR INTAKE/OUTPUT:  No intake or output data in the 24 hours ending 03/25/22 0959  CONSTITUTIONAL:  awake, alert, cooperative, no apparent distress, and appears stated age  LUNGS:  No increased work of breathing, good air exchange, no crackles or wheezing  CARDIOVASCULAR:  RRR  ABDOMEN:  Soft and round, non-distended  GENITAL/URINARY:  Left groin inguinal hernia palpated with no pain to palpation, no swelling, no eryhtema  SKIN:  no bruising or bleeding  DATA:    CBC:   Lab Results   Component Value Date    WBC 8.2 03/25/2022    RBC 5.08 03/25/2022    HGB 15.0 03/25/2022    HCT 42.8 03/25/2022    MCV 84.4 03/25/2022    MCH 29.6 03/25/2022    MCHC 35.1 03/25/2022    RDW 14.2 03/25/2022     03/25/2022    MPV 7.8 03/25/2022     CMP:    Lab Results   Component Value Date     03/25/2022    K 4.3 03/25/2022     03/25/2022    CO2 27 03/25/2022    BUN 14 03/25/2022    CREATININE 0.9 03/25/2022    GFRAA >60 03/25/2022    GFRAA >60 06/29/2012    AGRATIO 2.1 01/01/2022    LABGLOM >60 03/25/2022    GLUCOSE 132 03/25/2022    PROT 7.0 03/25/2022    PROT 7.4 06/29/2012    LABALBU 4.4 03/25/2022    CALCIUM 9.3 03/25/2022    BILITOT 0.8 03/25/2022    ALKPHOS 37 03/25/2022    AST 18 03/25/2022    ALT 17 03/25/2022     BMP:    Lab Results   Component Value Date     03/25/2022    K 4.3 03/25/2022     03/25/2022    CO2 27 03/25/2022    BUN 14 03/25/2022    LABALBU 4.4 03/25/2022    CREATININE 0.9 03/25/2022    CALCIUM 9.3 03/25/2022    GFRAA >60 03/25/2022    GFRAA >60 06/29/2012    LABGLOM >60 03/25/2022    GLUCOSE 132 03/25/2022     Hepatic Function Panel:    Lab Results   Component Value Date    ALKPHOS 37 03/25/2022    ALT 17 03/25/2022    AST 18 03/25/2022    PROT 7.0 03/25/2022    PROT 7.4 06/29/2012    BILITOT 0.8 03/25/2022    BILIDIR <0.2 03/25/2022    IBILI see below 03/25/2022    LABALBU 4.4 03/25/2022     PT/INR:  No results found for: PROTIME, INR  U/A:    Lab Results   Component Value Date    COLORU Yellow 03/25/2022    PROTEINU Negative 03/25/2022    PHUR 6.0 03/25/2022    WBCUA 0-2 03/25/2022    RBCUA None seen 03/25/2022    MUCUS 1+ 03/25/2022    CLARITYU Clear 03/25/2022    SPECGRAV >=1.030 03/25/2022    LEUKOCYTESUR TRACE 03/25/2022    UROBILINOGEN 0.2 03/25/2022    BILIRUBINUR Negative 03/25/2022    BLOODU Negative 03/25/2022    GLUCOSEU Negative 03/25/2022     Imaging:   CTA CAP 1/2/2022  Bilateral fat containing inguinal hernias noted after personally reviewing imaging    IMPRESSION/RECOMMENDATIONS:    74year old male with history of hypertrophic cardiomegaly who was recently seen by PCP for groin pain and was referred to surgery for inguinal hernia. Patient with symptoms this morning which have since resolved. CT from January reviewed which shows bilateral fat containing inguinal hernias. Due to symptomatic nature of his left sided hernia recommend repair of both hernias. Patient is scheduled to follow up with Dr. Nayan Oliveira on Monday. Will discuss surgery in more detail on Monday. Patient educated on symptoms of hernia and complications associated with hernia for which he should seek further medical care. All questions answered. Okay to discharge from general surgery standpoint.      Curly Simon DO  PGY-5 General Surgery  03/25/22  11:32 AM  [phone]

## 2022-03-28 ENCOUNTER — OFFICE VISIT (OUTPATIENT)
Dept: SURGERY | Age: 67
End: 2022-03-28
Payer: MEDICARE

## 2022-03-28 VITALS
WEIGHT: 225 LBS | HEART RATE: 56 BPM | SYSTOLIC BLOOD PRESSURE: 134 MMHG | HEIGHT: 72 IN | BODY MASS INDEX: 30.48 KG/M2 | DIASTOLIC BLOOD PRESSURE: 73 MMHG | OXYGEN SATURATION: 98 %

## 2022-03-28 DIAGNOSIS — K40.90 LEFT INGUINAL HERNIA: Primary | ICD-10-CM

## 2022-03-28 PROCEDURE — 1036F TOBACCO NON-USER: CPT | Performed by: SURGERY

## 2022-03-28 PROCEDURE — G8427 DOCREV CUR MEDS BY ELIG CLIN: HCPCS | Performed by: SURGERY

## 2022-03-28 PROCEDURE — G8484 FLU IMMUNIZE NO ADMIN: HCPCS | Performed by: SURGERY

## 2022-03-28 PROCEDURE — 99204 OFFICE O/P NEW MOD 45 MIN: CPT | Performed by: SURGERY

## 2022-03-28 PROCEDURE — 4040F PNEUMOC VAC/ADMIN/RCVD: CPT | Performed by: SURGERY

## 2022-03-28 PROCEDURE — 1123F ACP DISCUSS/DSCN MKR DOCD: CPT | Performed by: SURGERY

## 2022-03-28 PROCEDURE — G8417 CALC BMI ABV UP PARAM F/U: HCPCS | Performed by: SURGERY

## 2022-03-28 PROCEDURE — 3017F COLORECTAL CA SCREEN DOC REV: CPT | Performed by: SURGERY

## 2022-03-28 NOTE — LETTER
P - [facility]  [address]  [address] [facility]. [facility], [address]  Phone: [phone] Fax: [phone]            Surgery Order/Time:  3/28/22/4:08 PM  Conf. # _________________  Scheduled by: Marcelino Fritz Lpn                               **[facility] in [facility]**  Facility: [facility].    Surgery Date: 2022 Time: 11:00am    Pt arrival: 0900  Second Surgeon: N/A        Patient Name: Luis Deshpande  : 1955  Home Ph:[phone] (home)  [facility] [facility] [address]  PCP:  Lakeisha Danielle MD   Does patient speak English?: yes    needed? no                                             PROCEDURE: Laparoscopic left inguinal hernia possible right  CPT: 16228: Laparoscopic Inguinal Hernia Repair    DIAGNOSIS:      ICD-10-CM    1. Left inguinal hernia  K40.90        Anesthesia: General  Time Needed:  1 hour   Pt Position:  supine      Outpatient __x__ Admit ______  Assist._____   Cardiac Clearance: no  Patient to meet with Anesthesiology prior to surgery: no    Patient Allergies: Allergies   Allergen Reactions    Atorvastatin      Muscle pain  Muscle pain       Pre-Op H&P to be done by: Lakeisha Danielle MD  Pre-Op Antibiotics: Ancef: 2g IV On Call to OR      Physician: Samantha Persaud MD Date: 22             Insurance:     ID #      Ph #   Date called ________________   Princess [place] to: _____________ Precert Needed?  Yes  /  No  PreAuth # & Details   ______________________________________________________________________

## 2022-03-30 ENCOUNTER — TELEPHONE (OUTPATIENT)
Dept: SURGERY | Age: 67
End: 2022-03-30

## 2022-03-30 NOTE — TELEPHONE ENCOUNTER
Patient seen on 3/28/22 surgery letter received Laparoscopic left inguinal hernia possible right 1Hr OR time needed under general     Covid vaccinated     Pre op instructions reviewed including the need for the need of a H&P with a EXG. Pre op packet emailed to [email or web address]    Post op appt scheduled     Faxed to scheduling     [facility] [place]

## 2022-03-30 NOTE — PROGRESS NOTES
Place patient label inside box (if no patient label, complete below)  Name:  :  MR#:   Miles Jones / PROCEDURE  1. I (we), Emelia Vernon (Patient Name) authorize Donna Seth MD (Provider / Aidan Austin) and/or such assistants as may be selected by him/her, to perform the following operation/procedure(s): LAPAROSCOPIC LEFT INGUINAL HERNIA REPAIR POSSIBLE RIGHT       Note: If unable to obtain consent prior to an emergent procedure, document the emergent reason in the medical record. This procedure has been explained to my (our) satisfaction and included in the explanation was:  A) The intended benefit, nature, and extent of the procedure to be performed;  B) The significant risks involved and the probability of success;  C) Alternative procedures and methods of treatment;  D) The dangers and probable consequences of such alternatives (including no procedure or treatment); E) The expected consequences of the procedure on my future health;  F) Whether other qualified individuals would be performing important surgical tasks and/or whether  would be present to advise or support the procedure. I (we) understand that there are other risks of infection and other serious complications in the pre-operative/procedural and postoperative/procedural stages of my (our) care. I (we) have asked all of the questions which I (we) thought were important in deciding whether or not to undergo treatment or diagnosis. These questions have been answered to my (our) satisfaction. I (we) understand that no assurance can be given that the procedure will be a success, and no guarantee or warranty of success has been given to me (us).     2. It has been explained to me (us) that during the course of the operation/procedure, unforeseen conditions may be revealed that necessitate extension of the original procedure(s) or different procedure(s) than those set forth in Paragraph 1. I (we) authorize and request that the above-named physician, his/her assistants or his/her designees, perform procedures as necessary and desirable if deemed to be in my (our) best interest.     Revised 8/2/2021                                                                          Page 1 of 2                   3. I acknowledge that health care personnel may be observing this procedure for the purpose of medical education or other specified purposes as may be necessary as requested and/or approved by my (our) physician. 4. I (we) consent to the disposal by the hospital Pathologist of the removed tissue, parts or organs in accordance with hospital policy. 5. I do ____ do not ____ consent to the use of a local infiltration pain blocking agent that will be used by my provider/surgical provider to help alleviate pain during my procedure. 6. I do ____ do not ____ consent to an emergent blood transfusion in the case of a life-threatening situation that requires blood components to be administered. This consent is valid for 24 hours from the beginning of the procedure. 7. This patient does ____ or does not ____ currently have a DNR status/order. If DNR order is in place, obtain Addendum to the Surgical Consent for ALL Patients with a DNR Order to address courtney-operative status for limited intervention or DNR suspension.      8. I have read and fully understand the above Consent for Operation/Procedure and that all blanks were completed before I signed the consent.   _____________________________       _____________________      ____/____am/pm  Signature of Patient or legal representative      Printed Name / Relationship            Date / Time   ____________________________       _____________________      ____/____am/pm  Witness to Signature                                    Printed Name                    Date / Time     If patient is unable to sign or is a minor, complete the following)  Patient is a minor, ____ years of age, or unable to sign because:   ______________________________________________________________________________________________    Noreenyohana Denver If a phone consent is obtained, consent will be documented by using two health care professionals, each affirming that the consenting party has no questions and gives consent for the procedure discussed with the physician/provider.   _____________________          ____________________       _____/_____am/pm   2nd witness to phone consent        Printed name           Date / Time    Informed Consent:  I have provided the explanation described above in section 1 to the patient and/or legal representative.  I have provided the patient and/or legal representative with an opportunity to ask any questions about the proposed operation/procedure.   ___________________________          ____________________         ____/____am/pm  Provider / Proceduralist                            Printed name            Date / Time  Revised 8/2/2021                                                                      Page 2 of 2

## 2022-03-31 ENCOUNTER — TELEPHONE (OUTPATIENT)
Dept: SURGERY | Age: 67
End: 2022-03-31

## 2022-03-31 NOTE — PROGRESS NOTES
[facility] PRE-SURGICAL TESTING INSTRUCTIONS                                  PRIOR TO PROCEDURE DATE:        1. PLEASE FOLLOW ANY  GUIDELINES/ INSTRUCTIONS PRIOR TO YOUR PROCEDURE AS ADVISED BY YOUR SURGEON. 2. Arrange for someone to drive you home and be with you for the first 24 hours after discharge for your safety after your procedure for which you received sedation. Ensure it is someone we can share information with regarding your discharge. 3. You must contact your surgeon for instructions IF:   You are taking any blood thinners, aspirin, anti-inflammatory or vitamin E.   There is a change in your physical condition such as a cold, fever, rash, cuts, sores or any other infection, especially near your surgical site. 4. Do not drink alcohol the day before or day of your procedure. 5. A Pre-op History and Physical for surgery MUST be completed by your Physician or Urgent Care within 30 days of your procedure date. Please bring a copy with you on the day of your procedure and along with any other testing performed. THE DAY OF YOUR PROCEDURE:  1. Follow instructions for ARRIVAL TIME as DIRECTED BY YOUR SURGEON. 2. Enter the MAIN entrance from [place] and follow the signs to the free [employer] or [employer] parking (offered free of charge 6am-5pm). 3. Enter the Main Entrance of the hospital (do not enter from the lower level of the parking garage). Upon entrance, check in with the  at the main desk on your left. If no one is available at the desk, proceed into the [facility] Waiting Room and go through the door directly into the [facility]. There is a Check-in desk ACROSS from Room 5 (marked with a sign hanging from the ceiling). The phone number for the surgery center is [phone]. 4. Please call [phone] option #2 option #2 if you have not been preregistered yet.   On the day of your procedure bring your insurance card and photo ID. You will be registered at your bedside once brought back to your room. 5. DO NOT EAT ANYTHING eight hours prior to your arrival for surgery. May have 8 ounces of water 4 hours prior to your arrival for surgery. NOTE: ALL Gastric, Bariatric and Bowel surgery patients MUST follow their surgeon's instructions. 6. MEDICATIONS    Take the following medications with a SMALL sip of water: None   Bariatric patient's call surgeon if on diabetic medications as some need to be stopped 1 week preop   Use your usual dose of inhalers the morning of surgery. BRING your rescue inhaler with you to hospital.    Anesthesia does NOT want you to take insulin the morning of surgery. They will control your blood sugar while you are at the hospital. Please contact your ordering physician for instructions regarding your insulin the night before your procedure. If you have an insulin pump, please keep it set on basal rate. 7. Do not swallow water when brushing teeth. No gum, candy, mints or ice chips. Refrain from smoking or at least decrease the amount. 8. Dress in loose, comfortable clothing appropriate for redressing after your procedure. Do not wear jewelry (including body piercings), make-up (especially NO eye make-up), fingernail polish (NO toenail polish if foot/leg surgery), lotion, powders or metal hairclips. 9. Dentures, glasses, or contacts will need to be removed before your procedure. Bring cases for your glasses, contacts, dentures, or hearing aids to protect them while you are in surgery. 10. If you use a CPAP, please bring it with you on the day of your procedure. 11. We recommend that valuable personal  belongings such as cash, cell phones, e-tablets or jewelry, be left at home during your stay. The hospital will not be responsible for valuables that are not secured in the hospital safe.  However, if your insurance requires a co-pay, you may want to bring a method of payment, i.e. Check or credit card, if you wish to pay your co-pay the day of surgery. 12. If you are to stay overnight, you may bring a bag with personal items. Please have any large items you may need brought in by your family after your arrival to your hospital room. 15. If you have a Living Will or Durable Power of , please bring a copy on the day of your procedure. 15. With your permission, one family member may accompany you while you are being prepared for surgery. Once you are ready, additional family members may join you. HOW WE KEEP YOU SAFE and WORK TO PREVENT SURGICAL SITE INFECTIONS:  1. Health care workers should always check your ID bracelet to verify your name and birth date. You will be asked many times to state your name, date of birth, and allergies. 2. Health care workers should always clean their hands with soap or alcohol gel before providing care to you. It is okay to ask anyone if they cleaned their hands before they touch you. 3. You will be actively involved in verifying the type of procedure you are having and ensuring the correct surgical site. This will be confirmed multiple times prior to your procedure. Do NOT malcolm your surgery site UNLESS instructed to by your surgeon. 4. Do not shave or wax for 72 hours prior to procedure near your operative site. Shaving with a razor can irritate your skin and make it easier to develop an infection. On the day of your procedure, any hair that needs to be removed near the surgical site will be clipped by a healthcare worker using a special clippers designed to avoid skin irritation. 5. When you are in the operating room, your surgical site will be cleansed with a special soap, and in most cases, you will be given an antibiotic before the surgery begins. What to expect AFTER YOUR PROCEDURE:  1. Immediately following your procedure, your will be taken to the PACU for the first phase of your recovery.   Your nurse will help you recover from any potential side effects of anesthesia, such as extreme drowsiness, changes in your vital signs or breathing patterns. Nausea, headache, muscle aches, or sore throat may also occur after anesthesia. Your nurse will help you manage these potential side effects. 2. For comfort and safety, arrange to have someone at home with you for the first 24 hours after discharge. 3. You and your family will be given written instructions about your diet, activity, dressing care, medications, and return visits. 4. Once at home, should issues with nausea, pain, or bleeding occur, or should you notice any signs of infection, you should call your surgeon. 5. Always clean your hands before and after caring for your wound. Do not let your family touch your surgery site without cleaning their hands. 6. Narcotic pain medications can cause significant constipation. You may want to add a stool softener to your postoperative medication schedule or speak to your surgeon on how best to manage this SIDE EFFECT. SPECIAL INSTRUCTIONS call surgeon office and find out when to stop baby ASA    Thank you for allowing us to care for you. We strive to exceed your expectations in the delivery of care and service provided to you and your family. If you need to contact the [place] [address] staff for any reason, please call us at [phone]    Instructions reviewed with patient during preadmission testing phone interview.   Steven Barbosa RN.3/31/2022 .9:23 AM      ADDITIONAL EDUCATIONAL INFORMATION REVIEWED PER PHONE WITH YOU AND/OR YOUR FAMILY:  No Hibiclens® Bathing Instructions   Yes Antibacterial Soap

## 2022-03-31 NOTE — PROGRESS NOTES
3/31/22 @ 0932  Pt verbalizes understanding of PAT instructions.   Pt instructed to call surgeon office and find out when to stop baby ASA.  Adan Garrett

## 2022-04-01 NOTE — TELEPHONE ENCOUNTER
Patient stated that someone called him from [facility] stating that the office had to place the order for the EXG, but never mind that he had that done yesterday at his PCP office. Informed patient to call the office back if he had any other questions.

## 2022-04-04 ENCOUNTER — TELEPHONE (OUTPATIENT)
Dept: SURGERY | Age: 67
End: 2022-04-04

## 2022-04-04 RX ORDER — SODIUM CHLORIDE 0.9 % (FLUSH) 0.9 %
5-40 SYRINGE (ML) INJECTION EVERY 12 HOURS SCHEDULED
Status: CANCELLED | OUTPATIENT
Start: 2022-04-05

## 2022-04-04 RX ORDER — SODIUM CHLORIDE 9 MG/ML
25 INJECTION, SOLUTION INTRAVENOUS PRN
Status: CANCELLED | OUTPATIENT
Start: 2022-04-05

## 2022-04-04 RX ORDER — SODIUM CHLORIDE, SODIUM LACTATE, POTASSIUM CHLORIDE, CALCIUM CHLORIDE 600; 310; 30; 20 MG/100ML; MG/100ML; MG/100ML; MG/100ML
INJECTION, SOLUTION INTRAVENOUS CONTINUOUS
Status: CANCELLED | OUTPATIENT
Start: 2022-04-05

## 2022-04-04 RX ORDER — SODIUM CHLORIDE 0.9 % (FLUSH) 0.9 %
5-40 SYRINGE (ML) INJECTION PRN
Status: CANCELLED | OUTPATIENT
Start: 2022-04-05

## 2022-04-04 RX ORDER — LIDOCAINE HYDROCHLORIDE 10 MG/ML
1 INJECTION, SOLUTION EPIDURAL; INFILTRATION; INTRACAUDAL; PERINEURAL
Status: CANCELLED | OUTPATIENT
Start: 2022-04-05 | End: 2022-04-05

## 2022-04-04 NOTE — TELEPHONE ENCOUNTER
Informed patient that surgery time has been bumped up new time is 10:15am with arriving to hospital at 8:15am.

## 2022-04-05 ENCOUNTER — ANESTHESIA (OUTPATIENT)
Dept: OPERATING ROOM | Age: 67
End: 2022-04-05
Payer: MEDICARE

## 2022-04-05 ENCOUNTER — ANESTHESIA EVENT (OUTPATIENT)
Dept: OPERATING ROOM | Age: 67
End: 2022-04-05
Payer: MEDICARE

## 2022-04-05 ENCOUNTER — HOSPITAL ENCOUNTER (OUTPATIENT)
Age: 67
Setting detail: OUTPATIENT SURGERY
Discharge: HOME | End: 2022-04-05
Attending: SURGERY | Admitting: SURGERY
Payer: MEDICARE

## 2022-04-05 VITALS
BODY MASS INDEX: 29.67 KG/M2 | TEMPERATURE: 97.3 F | RESPIRATION RATE: 18 BRPM | OXYGEN SATURATION: 94 % | SYSTOLIC BLOOD PRESSURE: 132 MMHG | WEIGHT: 219.04 LBS | HEIGHT: 72 IN | DIASTOLIC BLOOD PRESSURE: 72 MMHG | HEART RATE: 57 BPM

## 2022-04-05 VITALS
DIASTOLIC BLOOD PRESSURE: 81 MMHG | OXYGEN SATURATION: 97 % | RESPIRATION RATE: 14 BRPM | SYSTOLIC BLOOD PRESSURE: 133 MMHG | TEMPERATURE: 98.1 F

## 2022-04-05 DIAGNOSIS — K40.20 BILATERAL INGUINAL HERNIA WITHOUT OBSTRUCTION OR GANGRENE, RECURRENCE NOT SPECIFIED: Primary | ICD-10-CM

## 2022-04-05 PROCEDURE — 7100000000 HC PACU RECOVERY - FIRST 15 MIN: Performed by: SURGERY

## 2022-04-05 PROCEDURE — 6360000002 HC RX W HCPCS: Performed by: NURSE ANESTHETIST, CERTIFIED REGISTERED

## 2022-04-05 PROCEDURE — 49650 LAP ING HERNIA REPAIR INIT: CPT | Performed by: SURGERY

## 2022-04-05 PROCEDURE — 6360000002 HC RX W HCPCS: Performed by: FAMILY MEDICINE

## 2022-04-05 PROCEDURE — 2580000003 HC RX 258: Performed by: SURGERY

## 2022-04-05 PROCEDURE — C1781 MESH (IMPLANTABLE): HCPCS | Performed by: SURGERY

## 2022-04-05 PROCEDURE — 7100000001 HC PACU RECOVERY - ADDTL 15 MIN: Performed by: SURGERY

## 2022-04-05 PROCEDURE — 3700000001 HC ADD 15 MINUTES (ANESTHESIA): Performed by: SURGERY

## 2022-04-05 PROCEDURE — 2500000003 HC RX 250 WO HCPCS: Performed by: NURSE ANESTHETIST, CERTIFIED REGISTERED

## 2022-04-05 PROCEDURE — 2580000003 HC RX 258: Performed by: FAMILY MEDICINE

## 2022-04-05 PROCEDURE — 2709999900 HC NON-CHARGEABLE SUPPLY: Performed by: SURGERY

## 2022-04-05 PROCEDURE — A4217 STERILE WATER/SALINE, 500 ML: HCPCS | Performed by: SURGERY

## 2022-04-05 PROCEDURE — C1713 ANCHOR/SCREW BN/BN,TIS/BN: HCPCS | Performed by: SURGERY

## 2022-04-05 PROCEDURE — 3700000000 HC ANESTHESIA ATTENDED CARE: Performed by: SURGERY

## 2022-04-05 PROCEDURE — 2500000003 HC RX 250 WO HCPCS: Performed by: SURGERY

## 2022-04-05 PROCEDURE — 3600000004 HC SURGERY LEVEL 4 BASE: Performed by: SURGERY

## 2022-04-05 PROCEDURE — 7100000011 HC PHASE II RECOVERY - ADDTL 15 MIN: Performed by: SURGERY

## 2022-04-05 PROCEDURE — 7100000010 HC PHASE II RECOVERY - FIRST 15 MIN: Performed by: SURGERY

## 2022-04-05 PROCEDURE — 3600000014 HC SURGERY LEVEL 4 ADDTL 15MIN: Performed by: SURGERY

## 2022-04-05 DEVICE — MESH HERN L W10.8XL16CM R INGUINAL WHT POLYPR MFIL: Type: IMPLANTABLE DEVICE | Site: INGUINAL | Status: FUNCTIONAL

## 2022-04-05 DEVICE — MESH HERN L W10.8XL16CM L INGUINAL WHT POLYPR MFIL: Type: IMPLANTABLE DEVICE | Site: INGUINAL | Status: FUNCTIONAL

## 2022-04-05 DEVICE — SYSTEM PERM FIX L37CM 15 FAST CAPSUR: Type: IMPLANTABLE DEVICE | Site: INGUINAL | Status: FUNCTIONAL

## 2022-04-05 RX ORDER — SODIUM CHLORIDE, SODIUM LACTATE, POTASSIUM CHLORIDE, CALCIUM CHLORIDE 600; 310; 30; 20 MG/100ML; MG/100ML; MG/100ML; MG/100ML
INJECTION, SOLUTION INTRAVENOUS CONTINUOUS
Status: DISCONTINUED | OUTPATIENT
Start: 2022-04-05 | End: 2022-04-05 | Stop reason: HOSPADM

## 2022-04-05 RX ORDER — OXYCODONE HYDROCHLORIDE 5 MG/1
10 TABLET ORAL PRN
Status: DISCONTINUED | OUTPATIENT
Start: 2022-04-05 | End: 2022-04-05 | Stop reason: HOSPADM

## 2022-04-05 RX ORDER — OXYCODONE HYDROCHLORIDE 5 MG/1
5 TABLET ORAL PRN
Status: DISCONTINUED | OUTPATIENT
Start: 2022-04-05 | End: 2022-04-05 | Stop reason: HOSPADM

## 2022-04-05 RX ORDER — ONDANSETRON 2 MG/ML
INJECTION INTRAMUSCULAR; INTRAVENOUS PRN
Status: DISCONTINUED | OUTPATIENT
Start: 2022-04-05 | End: 2022-04-05 | Stop reason: SDUPTHER

## 2022-04-05 RX ORDER — BUPIVACAINE HYDROCHLORIDE 5 MG/ML
INJECTION, SOLUTION EPIDURAL; INTRACAUDAL PRN
Status: DISCONTINUED | OUTPATIENT
Start: 2022-04-05 | End: 2022-04-05 | Stop reason: ALTCHOICE

## 2022-04-05 RX ORDER — CEFAZOLIN SODIUM 2 G/50ML
SOLUTION INTRAVENOUS PRN
Status: DISCONTINUED | OUTPATIENT
Start: 2022-04-05 | End: 2022-04-05 | Stop reason: SDUPTHER

## 2022-04-05 RX ORDER — LABETALOL HYDROCHLORIDE 5 MG/ML
10 INJECTION, SOLUTION INTRAVENOUS
Status: DISCONTINUED | OUTPATIENT
Start: 2022-04-05 | End: 2022-04-05 | Stop reason: HOSPADM

## 2022-04-05 RX ORDER — OXYCODONE HYDROCHLORIDE 5 MG/1
5 TABLET ORAL EVERY 6 HOURS PRN
Qty: 28 TABLET | Refills: 0 | Status: SHIPPED | OUTPATIENT
Start: 2022-04-05 | End: 2022-04-12

## 2022-04-05 RX ORDER — SODIUM CHLORIDE 9 MG/ML
INJECTION, SOLUTION INTRAVENOUS CONTINUOUS
Status: DISCONTINUED | OUTPATIENT
Start: 2022-04-05 | End: 2022-04-05 | Stop reason: HOSPADM

## 2022-04-05 RX ORDER — MAGNESIUM HYDROXIDE 1200 MG/15ML
LIQUID ORAL CONTINUOUS PRN
Status: COMPLETED | OUTPATIENT
Start: 2022-04-05 | End: 2022-04-05

## 2022-04-05 RX ORDER — GLYCOPYRROLATE 1 MG/5 ML
SYRINGE (ML) INTRAVENOUS PRN
Status: DISCONTINUED | OUTPATIENT
Start: 2022-04-05 | End: 2022-04-05 | Stop reason: SDUPTHER

## 2022-04-05 RX ORDER — SODIUM CHLORIDE 9 MG/ML
25 INJECTION, SOLUTION INTRAVENOUS PRN
Status: DISCONTINUED | OUTPATIENT
Start: 2022-04-05 | End: 2022-04-05 | Stop reason: HOSPADM

## 2022-04-05 RX ORDER — APREPITANT 40 MG/1
40 CAPSULE ORAL ONCE
Status: COMPLETED | OUTPATIENT
Start: 2022-04-05 | End: 2022-04-05

## 2022-04-05 RX ORDER — SUCCINYLCHOLINE/SOD CL,ISO/PF 200MG/10ML
SYRINGE (ML) INTRAVENOUS PRN
Status: DISCONTINUED | OUTPATIENT
Start: 2022-04-05 | End: 2022-04-05 | Stop reason: SDUPTHER

## 2022-04-05 RX ORDER — DEXAMETHASONE SODIUM PHOSPHATE 4 MG/ML
INJECTION, SOLUTION INTRA-ARTICULAR; INTRALESIONAL; INTRAMUSCULAR; INTRAVENOUS; SOFT TISSUE PRN
Status: DISCONTINUED | OUTPATIENT
Start: 2022-04-05 | End: 2022-04-05 | Stop reason: SDUPTHER

## 2022-04-05 RX ORDER — EPHEDRINE SULFATE 50 MG/ML
INJECTION INTRAVENOUS PRN
Status: DISCONTINUED | OUTPATIENT
Start: 2022-04-05 | End: 2022-04-05 | Stop reason: SDUPTHER

## 2022-04-05 RX ORDER — ONDANSETRON 2 MG/ML
4 INJECTION INTRAMUSCULAR; INTRAVENOUS
Status: DISCONTINUED | OUTPATIENT
Start: 2022-04-05 | End: 2022-04-05 | Stop reason: HOSPADM

## 2022-04-05 RX ORDER — LIDOCAINE HYDROCHLORIDE 20 MG/ML
INJECTION, SOLUTION INTRAVENOUS PRN
Status: DISCONTINUED | OUTPATIENT
Start: 2022-04-05 | End: 2022-04-05 | Stop reason: SDUPTHER

## 2022-04-05 RX ORDER — DOCUSATE SODIUM 100 MG/1
100 CAPSULE, LIQUID FILLED ORAL 2 TIMES DAILY
Qty: 30 CAPSULE | Refills: 0 | Status: SHIPPED | OUTPATIENT
Start: 2022-04-05 | End: 2022-04-19

## 2022-04-05 RX ORDER — FENTANYL CITRATE 50 UG/ML
25 INJECTION, SOLUTION INTRAMUSCULAR; INTRAVENOUS EVERY 5 MIN PRN
Status: DISCONTINUED | OUTPATIENT
Start: 2022-04-05 | End: 2022-04-05 | Stop reason: HOSPADM

## 2022-04-05 RX ORDER — SODIUM CHLORIDE 0.9 % (FLUSH) 0.9 %
5-40 SYRINGE (ML) INJECTION PRN
Status: DISCONTINUED | OUTPATIENT
Start: 2022-04-05 | End: 2022-04-05 | Stop reason: HOSPADM

## 2022-04-05 RX ORDER — FENTANYL CITRATE 50 UG/ML
INJECTION, SOLUTION INTRAMUSCULAR; INTRAVENOUS PRN
Status: DISCONTINUED | OUTPATIENT
Start: 2022-04-05 | End: 2022-04-05 | Stop reason: SDUPTHER

## 2022-04-05 RX ORDER — DIPHENHYDRAMINE HYDROCHLORIDE 50 MG/ML
12.5 INJECTION INTRAMUSCULAR; INTRAVENOUS
Status: DISCONTINUED | OUTPATIENT
Start: 2022-04-05 | End: 2022-04-05 | Stop reason: HOSPADM

## 2022-04-05 RX ORDER — PROPOFOL 10 MG/ML
INJECTION, EMULSION INTRAVENOUS PRN
Status: DISCONTINUED | OUTPATIENT
Start: 2022-04-05 | End: 2022-04-05 | Stop reason: SDUPTHER

## 2022-04-05 RX ORDER — ROCURONIUM BROMIDE 10 MG/ML
INJECTION, SOLUTION INTRAVENOUS PRN
Status: DISCONTINUED | OUTPATIENT
Start: 2022-04-05 | End: 2022-04-05 | Stop reason: SDUPTHER

## 2022-04-05 RX ORDER — PHENYLEPHRINE HYDROCHLORIDE 10 MG/ML
INJECTION INTRAVENOUS PRN
Status: DISCONTINUED | OUTPATIENT
Start: 2022-04-05 | End: 2022-04-05 | Stop reason: SDUPTHER

## 2022-04-05 RX ORDER — MEPERIDINE HYDROCHLORIDE 25 MG/ML
12.5 INJECTION INTRAMUSCULAR; INTRAVENOUS; SUBCUTANEOUS EVERY 5 MIN PRN
Status: DISCONTINUED | OUTPATIENT
Start: 2022-04-05 | End: 2022-04-05 | Stop reason: HOSPADM

## 2022-04-05 RX ORDER — LORAZEPAM 2 MG/ML
0.5 INJECTION INTRAMUSCULAR
Status: DISCONTINUED | OUTPATIENT
Start: 2022-04-05 | End: 2022-04-05 | Stop reason: HOSPADM

## 2022-04-05 RX ORDER — SODIUM CHLORIDE 0.9 % (FLUSH) 0.9 %
5-40 SYRINGE (ML) INJECTION EVERY 12 HOURS SCHEDULED
Status: DISCONTINUED | OUTPATIENT
Start: 2022-04-05 | End: 2022-04-05 | Stop reason: HOSPADM

## 2022-04-05 RX ADMIN — FENTANYL CITRATE 50 MCG: 50 INJECTION, SOLUTION INTRAMUSCULAR; INTRAVENOUS at 11:15

## 2022-04-05 RX ADMIN — APREPITANT 40 MG: 40 CAPSULE ORAL at 09:30

## 2022-04-05 RX ADMIN — ONDANSETRON 4 MG: 2 INJECTION INTRAMUSCULAR; INTRAVENOUS at 11:12

## 2022-04-05 RX ADMIN — LIDOCAINE HYDROCHLORIDE 100 MG: 20 INJECTION, SOLUTION INTRAVENOUS at 10:31

## 2022-04-05 RX ADMIN — Medication 0.2 MG: at 10:05

## 2022-04-05 RX ADMIN — ROCURONIUM BROMIDE 50 MG: 10 INJECTION INTRAVENOUS at 09:55

## 2022-04-05 RX ADMIN — FENTANYL CITRATE 50 MCG: 50 INJECTION, SOLUTION INTRAMUSCULAR; INTRAVENOUS at 09:38

## 2022-04-05 RX ADMIN — EPHEDRINE SULFATE 10 MG: 50 INJECTION INTRAVENOUS at 10:44

## 2022-04-05 RX ADMIN — DEXAMETHASONE SODIUM PHOSPHATE 4 MG: 4 INJECTION, SOLUTION INTRAMUSCULAR; INTRAVENOUS at 10:30

## 2022-04-05 RX ADMIN — PHENYLEPHRINE HYDROCHLORIDE 100 MCG: 10 INJECTION INTRAVENOUS at 09:52

## 2022-04-05 RX ADMIN — Medication 0.2 MG: at 10:10

## 2022-04-05 RX ADMIN — CEFAZOLIN SODIUM 2000 MG: 2 SOLUTION INTRAVENOUS at 09:44

## 2022-04-05 RX ADMIN — LIDOCAINE HYDROCHLORIDE 100 MG: 20 INJECTION, SOLUTION INTRAVENOUS at 09:47

## 2022-04-05 RX ADMIN — SODIUM CHLORIDE, SODIUM LACTATE, POTASSIUM CHLORIDE, AND CALCIUM CHLORIDE: .6; .31; .03; .02 INJECTION, SOLUTION INTRAVENOUS at 09:40

## 2022-04-05 RX ADMIN — PROPOFOL 180 MG: 10 INJECTION, EMULSION INTRAVENOUS at 09:47

## 2022-04-05 RX ADMIN — SUGAMMADEX 200 MG: 100 INJECTION, SOLUTION INTRAVENOUS at 11:14

## 2022-04-05 RX ADMIN — HYDROMORPHONE HYDROCHLORIDE 0.5 MG: 1 INJECTION, SOLUTION INTRAMUSCULAR; INTRAVENOUS; SUBCUTANEOUS at 12:04

## 2022-04-05 RX ADMIN — ROCURONIUM BROMIDE 20 MG: 10 INJECTION INTRAVENOUS at 10:35

## 2022-04-05 RX ADMIN — Medication 120 MG: at 09:47

## 2022-04-05 ASSESSMENT — PULMONARY FUNCTION TESTS
PIF_VALUE: 28
PIF_VALUE: 17
PIF_VALUE: 29
PIF_VALUE: 3
PIF_VALUE: 29
PIF_VALUE: 34
PIF_VALUE: 31
PIF_VALUE: 30
PIF_VALUE: 25
PIF_VALUE: 21
PIF_VALUE: 29
PIF_VALUE: 21
PIF_VALUE: 34
PIF_VALUE: 29
PIF_VALUE: 30
PIF_VALUE: 21
PIF_VALUE: 32
PIF_VALUE: 29
PIF_VALUE: 30
PIF_VALUE: 33
PIF_VALUE: 29
PIF_VALUE: 20
PIF_VALUE: 32
PIF_VALUE: 21
PIF_VALUE: 16
PIF_VALUE: 22
PIF_VALUE: 18
PIF_VALUE: 21
PIF_VALUE: 29
PIF_VALUE: 2
PIF_VALUE: 27
PIF_VALUE: 21
PIF_VALUE: 30
PIF_VALUE: 26
PIF_VALUE: 29
PIF_VALUE: 31
PIF_VALUE: 28
PIF_VALUE: 35
PIF_VALUE: 29
PIF_VALUE: 30
PIF_VALUE: 35
PIF_VALUE: 28
PIF_VALUE: 22
PIF_VALUE: 31
PIF_VALUE: 17
PIF_VALUE: 35
PIF_VALUE: 34
PIF_VALUE: 34
PIF_VALUE: 21
PIF_VALUE: 30
PIF_VALUE: 28
PIF_VALUE: 31
PIF_VALUE: 33
PIF_VALUE: 29
PIF_VALUE: 30
PIF_VALUE: 25
PIF_VALUE: 31
PIF_VALUE: 31
PIF_VALUE: 30
PIF_VALUE: 33
PIF_VALUE: 30
PIF_VALUE: 21
PIF_VALUE: 30
PIF_VALUE: 17
PIF_VALUE: 17
PIF_VALUE: 31
PIF_VALUE: 29
PIF_VALUE: 32
PIF_VALUE: 31
PIF_VALUE: 27
PIF_VALUE: 5
PIF_VALUE: 30
PIF_VALUE: 31
PIF_VALUE: 22
PIF_VALUE: 35
PIF_VALUE: 30
PIF_VALUE: 28
PIF_VALUE: 29
PIF_VALUE: 32
PIF_VALUE: 34
PIF_VALUE: 31
PIF_VALUE: 35
PIF_VALUE: 35
PIF_VALUE: 21

## 2022-04-05 ASSESSMENT — PAIN DESCRIPTION - PAIN TYPE
TYPE: SURGICAL PAIN
TYPE: SURGICAL PAIN

## 2022-04-05 ASSESSMENT — PAIN SCALES - GENERAL
PAINLEVEL_OUTOF10: 0
PAINLEVEL_OUTOF10: 4
PAINLEVEL_OUTOF10: 4
PAINLEVEL_OUTOF10: 7
PAINLEVEL_OUTOF10: 4

## 2022-04-05 ASSESSMENT — PAIN DESCRIPTION - FREQUENCY
FREQUENCY: CONTINUOUS
FREQUENCY: CONTINUOUS

## 2022-04-05 ASSESSMENT — PAIN DESCRIPTION - LOCATION
LOCATION: ABDOMEN
LOCATION: ABDOMEN

## 2022-04-05 ASSESSMENT — PAIN DESCRIPTION - ONSET
ONSET: ON-GOING
ONSET: ON-GOING

## 2022-04-05 ASSESSMENT — PAIN DESCRIPTION - PROGRESSION
CLINICAL_PROGRESSION: GRADUALLY IMPROVING
CLINICAL_PROGRESSION: GRADUALLY IMPROVING

## 2022-04-05 ASSESSMENT — PAIN - FUNCTIONAL ASSESSMENT
PAIN_FUNCTIONAL_ASSESSMENT: ACTIVITIES ARE NOT PREVENTED
PAIN_FUNCTIONAL_ASSESSMENT: 0-10

## 2022-04-05 ASSESSMENT — PAIN DESCRIPTION - ORIENTATION
ORIENTATION: MID
ORIENTATION: MID;LOWER

## 2022-04-05 NOTE — OP NOTE
Operative Note      Patient: Andrew Mills  YOB: 1955  MRN: [id number]    Date of Procedure: 4/5/2022    Pre-Op Diagnosis: Left inguinal hernia [K40.90]    Post-Op Diagnosis: Bilateral inguinal hernia       Procedure(s):  LAPAROSCOPIC BILATERAL INGUINAL HERNIA REPAIR    Surgeon(s):  Judith Morrow MD    Assistant:   Resident: Otto Jackson MD    Anesthesia: General    Estimated Blood Loss (mL): less than 50     Complications: None    Specimens:   * No specimens in log *    Implants:  * No implants in log *      Drains: * No LDAs found *    Findings: bilateral indirect inguinal hernias with fat. L sided cord lipoma was noted. Detailed Description of Procedure:   INDICATIONS: The patient is a 74 y.o. male who presented to the ED a few weeks ago with a bulge in left groin. Patient noted that it was increasing in size and was associated with pain with activity. CT scan performed which confirmed bilateral fat containing inguinal hernias and therefore was offered surgical repair. The risks, benefits, and alternatives of procedure were explained to the patient including risks of bleeding, infection. Patient understood all of these risks and agreed to proceed. PROCEDURE IN DETAIL:   The patient was brought to the operating room. The patient was placed in the supine position and sedation was started. SCDs were placed and pre-operative antibiotics were given. The abdomen was prepped and draped in the usual sterile fashion. A time-out was performed per hospital policy. The decision was made to make the incision to the right of the umbilicus. The skin incision was made using an 11 blade scalpel and it was carried down through the subcutaneous tissues. An S retractor and a hemostat were used to locate the anterior fascia. Once exposed a small incision was made and the posterior fascia was exposed. A 12mm port was placed in the preperitoneal space.  The preperitoneal space was bluntly dissected using the laparoscope. A 5mm trocar was placed under direct vision just below the umbilicus in the midline. The loose areloar tissue was dissected down to expose the pubic tubercle and dissected both medially and laterally bluntly. An additional 5mm port was placed three fingerbreadths below the infraumbilical port under direct visualization. Attention was turned to the left inguinal region. The preperitoneal space was further developed in order to expose Luis Enrique's ligament and the inferior epigastric vessels. The dissection was also continued laterally to the anterior abdominal wall. The cord structures were identified and protected throughout the dissection. A left inguinal hernia sac was bluntly dissected off of the cord structures. A cord lipoma was freed and was reduced. Attention was then turned to the right inguinal region. The preperitoneal space was dissected bluntly as described above. Again Coopers ligament and the inferior epigastric vessels were exposed. The dissection was then carried laterally to the anterior abdominal wall. The hernia sac was identified and dissected bluntly. This was reduced. A large right Bard 3DMAX mesh was selected and passed into the preperitoneal space. This was placed over the hernia defect and completely covered all areas of weakness. The mesh was secured with 4 tacks; 1 in Coopers ligament, 1 laterally, 1 superomedially, and 1 medially just superior to the pubis. A large left Bard 3DMAX mesh was selected and passed into the preperitoneal space. This was placed over the hernia defect and completely covered all areas of weakness. The mesh was secured with 4 tacks; 1 in Coopers ligament, 1 laterally, 1 superomedially, and 1 medially just superior to the pubis. The abdomen was desufflated under direct visualization while holding the left sided mesh in place. The fascia of the periumbilical incision was repaired with a figure of eight 0-Vicryl suture.  The skin incisions were reapproximated using interrupted 4-0 Monocryl sutures. Dermabond was applied to the skin incisions. Dr. Robert Sheriff was present and scrubbed for the entirety of the case. The patient tolerated the procedure well, was woken up and brought to the PACU in stable condition. All counts were correct x2 at the end of the procedure.       Electronically signed by Roberta Wells MD on 4/5/2022 at 11:17 AM

## 2022-04-05 NOTE — PROGRESS NOTES
PACU Transfer to [facility]    Vitals:    04/05/22 1315   BP: 128/76   Pulse: 59   Resp: 16   Temp: 97.2 °F (36.2 °C)   SpO2: 92%         Intake/Output Summary (Last 24 hours) at 4/5/2022 1319  Last data filed at 4/5/2022 1120  Gross per 24 hour   Intake --   Output 25 ml   Net -25 ml       Pain assessment:  level of pain (1-10, 10 severe),   Pain Level: 4    Patient transferred to care of VELMA RN.    4/5/2022 1:19 PM

## 2022-04-05 NOTE — ANESTHESIA POSTPROCEDURE EVALUATION
Department of Anesthesiology  Postprocedure Note    Patient: Hugo Fortune  MRN: [id number]  YOB: 1955  Date of evaluation: 4/5/2022  Time:  3:25 PM     Procedure Summary     Date: 04/05/22 Room / Location: [address] [place] [address]    Anesthesia Start: 0940 Anesthesia Stop: 7252    Procedure: [place] (Left ) Diagnosis:       Unilateral inguinal hernia without obstruction or gangrene, recurrence not specified      Left inguinal hernia      (Left inguinal hernia [K40.90])    Surgeons: Tommie Garcia MD Responsible Provider: Kenroy King MD    Anesthesia Type: general ASA Status: 2          Anesthesia Type: general    Malika Phase I: Malika Score: 10    Malika Phase II: Malika Score: 10    Last vitals: Reviewed and per EMR flowsheets.        Anesthesia Post Evaluation    Patient location during evaluation: PACU  Level of consciousness: awake  Complications: no  Multimodal analgesia pain management approach

## 2022-04-05 NOTE — ANESTHESIA PRE PROCEDURE
Department of Anesthesiology  Preprocedure Note       Name:  Jojo Stacy   Age:  74 y.o.  :  1955                                          MRN:  [id number]         Date:  2022      Surgeon: Rose Quinonez):  Konstantin Galvan MD    Procedure: Procedure(s):  LAPAROSCOPIC LEFT INGUINAL HERNIA REPAIR, POSSIBLE RIGHT    Medications prior to admission:   Prior to Admission medications    Medication Sig Start Date End Date Taking?  Authorizing Provider   metoprolol succinate (TOPROL XL) 25 MG extended release tablet TAKE ONE TABLET BY MOUTH DAILY 21   Historical Provider, MD   aspirin 81 MG EC tablet Take 81 mg by mouth daily    Historical Provider, MD   tadalafil (CIALIS) 20 MG tablet Take 20 mg by mouth daily as needed 21   Historical Provider, MD       Current medications:    Current Facility-Administered Medications   Medication Dose Route Frequency Provider Last Rate Last Admin    0.9 % sodium chloride infusion   IntraVENous Continuous Reji Pink MD        sodium chloride flush 0.9 % injection 5-40 mL  5-40 mL IntraVENous 2 times per day Reji Pink MD        sodium chloride flush 0.9 % injection 5-40 mL  5-40 mL IntraVENous PRN Reji Pink MD        0.9 % sodium chloride infusion  25 mL IntraVENous PRN Reji Pink MD        meperidine (DEMEROL) injection 12.5 mg  12.5 mg IntraVENous Q5 Min PRN Reji Pink MD        fentaNYL (SUBLIMAZE) injection 25 mcg  25 mcg IntraVENous Q5 Min PRN Reji Pink MD        HYDROmorphone (DILAUDID) injection 0.5 mg  0.5 mg IntraVENous Q5 Min PRN Reji Pink MD        oxyCODONE (ROXICODONE) immediate release tablet 5 mg  5 mg Oral PRN Reji Pink MD        Or    oxyCODONE (ROXICODONE) immediate release tablet 10 mg  10 mg Oral PRN Reji Pink MD        ondansetron [facility]) injection 4 mg  4 mg IntraVENous Once PRN Reji Pink MD        prochlorperazine (COMPAZINE) 5 mg in sodium chloride (PF) 10 mL injection  5 mg IntraVENous Once PRN Heather Duran MD        LORazepam (ATIVAN) injection 0.5 mg  0.5 mg IntraVENous Once PRN Heather Duran MD        diphenhydrAMINE (BENADRYL) injection 12.5 mg  12.5 mg IntraVENous Once PRN Heather Duran MD        labetalol (NORMODYNE;TRANDATE) injection 10 mg  10 mg IntraVENous Q15 Min PRN Heather Duran MD        aprepitant (EMEND) capsule 40 mg  40 mg Oral Once Heather Duran MD           Allergies: Allergies   Allergen Reactions    Atorvastatin      Muscle pain  Muscle pain         Problem List:  There is no problem list on file for this patient. Past Medical History:        Diagnosis Date    Hypertrophic cardiomegaly     Inguinal hernia     left    PONV (postoperative nausea and vomiting)        Past Surgical History:        Procedure Laterality Date    KNEE SURGERY         Social History:    Social History     Tobacco Use    Smoking status: Former Smoker     Quit date:      Years since quittin.2    Smokeless tobacco: Never Used   Substance Use Topics    Alcohol use: Yes     Comment: daily beer                                Counseling given: Not Answered      Vital Signs (Current): There were no vitals filed for this visit.                                            BP Readings from Last 3 Encounters:   22 134/73   22 (!) 147/85   22 (!) 154/89       NPO Status:                                                                                 BMI:   Wt Readings from Last 3 Encounters:   22 225 lb (102.1 kg)   22 220 lb (99.8 kg)     There is no height or weight on file to calculate BMI.    CBC:   Lab Results   Component Value Date    WBC 8.2 2022    RBC 5.08 2022    HGB 15.0 2022    HCT 42.8 2022    MCV 84.4 2022    RDW 14.2 2022     2022       CMP:   Lab Results   Component Value Date     2022    K 4.3 2022     2022    CO2 27 2022    BUN 14 03/25/2022    CREATININE 0.9 03/25/2022    GFRAA >60 03/25/2022    GFRAA >60 06/29/2012    AGRATIO 2.1 01/01/2022    LABGLOM >60 03/25/2022    GLUCOSE 132 03/25/2022    PROT 7.0 03/25/2022    PROT 7.4 06/29/2012    CALCIUM 9.3 03/25/2022    BILITOT 0.8 03/25/2022    ALKPHOS 37 03/25/2022    AST 18 03/25/2022    ALT 17 03/25/2022       POC Tests: No results for input(s): POCGLU, POCNA, POCK, POCCL, POCBUN, POCHEMO, POCHCT in the last 72 hours. Coags: No results found for: PROTIME, INR, APTT    HCG (If Applicable): No results found for: PREGTESTUR, PREGSERUM, HCG, HCGQUANT     ABGs: No results found for: PHART, PO2ART, BJK5DFF, NUY7KJV, BEART, Y3DALHXI     Type & Screen (If Applicable):  No results found for: LABABO, LABRH    Drug/Infectious Status (If Applicable):  No results found for: HIV, HEPCAB    COVID-19 Screening (If Applicable): No results found for: COVID19        Anesthesia Evaluation   history of anesthetic complications:   Airway: Mallampati: II  TM distance: >3 FB   Neck ROM: full  Mouth opening: > = 3 FB Dental:          Pulmonary:                              Cardiovascular:  Exercise tolerance: good (>4 METS),   (+) hypertension:,         Rhythm: regular  Rate: normal  Echocardiogram reviewed    Cleared by cardiology              Neuro/Psych:               GI/Hepatic/Renal:             Endo/Other:                     Abdominal:             Vascular: Other Findings:      --------------------------------------------------------------------  Study Conclusions     - Study data: No prior study was available for comparison. - Procedure narrative: Image quality was poor. The study was    technically limited due to poor acoustic window availability.  - Left ventricle: The cavity size was normal. Wall thickness was    increased in a pattern of mild LVH. Systolic function was normal.    The estimated ejection fraction was in the range of 55% to 60%.     Although no diagnostic regional wall motion abnormality was    identified, this possibility cannot be completely excluded on the    basis of this study. - Left atrium: The atrium was mildly dilated. - Right atrium: The atrium was mildly dilated. - Pulmonary arteries: Systolic pressure was mildly increased,    estimated to be 32mm Hg plus RA pressure.     --------------------------------------------------------------------       Anesthesia Plan      general     ASA 2       Induction: intravenous. Anesthetic plan and risks discussed with patient. Plan discussed with CRNA.                   Tylor Teixeira MD   4/5/2022

## 2022-04-05 NOTE — PROGRESS NOTES
Patient to pacu 6 s/p LAPAROSCOPIC BILATERAL INGUINAL HERNIA REPAIR - Left, report received from CRNA, reported hemodynamically stable intra op. All vitals stable upon arrival. patient unable to follow commands at this time.

## 2022-04-05 NOTE — H&P
[address] Same Day Surgery Update H & P  Department of General Surgery   Surgical Service   Pre-operative History and Physical  Last H & P within the last 30 days. DIAGNOSIS:   Unilateral inguinal hernia without obstruction or gangrene, recurrence not specified [K40.90]  Left inguinal hernia [K40.90]    Procedure(s):  LAPAROSCOPIC LEFT INGUINAL HERNIA REPAIR, POSSIBLE RIGHT    History obtained from: Patient interview and EHR      HISTORY OF PRESENT ILLNESS:   Patient is a 74 y.o. male with left inguinal hernia, associated with recurrent episodes of left groin pain and swelling. He presents today for repair. Illness screening:  Patient denies recent fever, chills, worsening cough, or known sick exposure     Past Medical History:        Diagnosis Date    Hypertrophic cardiomegaly     Inguinal hernia     left    PONV (postoperative nausea and vomiting)      Past Surgical History:        Procedure Laterality Date    KNEE SURGERY         Medications Prior to Admission:      Prior to Admission medications    Medication Sig Start Date End Date Taking? Authorizing Provider   metoprolol succinate (TOPROL XL) 25 MG extended release tablet TAKE ONE TABLET BY MOUTH DAILY 12/27/21   Historical Provider, MD   aspirin 81 MG EC tablet Take 81 mg by mouth daily    Historical Provider, MD   tadalafil (CIALIS) 20 MG tablet Take 20 mg by mouth daily as needed 4/13/21   Historical Provider, MD         Allergies:  Atorvastatin    PHYSICAL EXAM:      BP (!) 148/93   Pulse 51   Temp 97.2 °F (36.2 °C) (Temporal)   Resp 16   Ht 6' (1.829 m)   Wt 219 lb 0.6 oz (99.4 kg)   SpO2 96%   BMI 29.71 kg/m²      Airway:  Airway patent with no audible stridor. Heart:  Bradycardic, with regular rhythm. No murmur noted. Lungs:  No increased work of breathing, good air exchange, clear to auscultation bilaterally, no crackles or wheezing.     Abdomen:  Soft, non-distended, non-tender, no rebound tenderness or guarding, and no masses palpated. ASSESSMENT AND PLAN     Patient is a 74 y.o. male with above specified procedure planned. 1.  The patients history and physical was obtained and signed off by the pre-admission testing department. Patient seen and focused exam done today- no new changes since last physical exam on 4/1/2022.    2.  Access to ancillary services are available per request of the provider. 3.  Heart rate is bradycardic at 51 bpm, which is similar to previously documented recordings, and the patient is asymptomatic.        ALMITA Parsons - CNP     4/5/2022

## 2022-04-05 NOTE — PROGRESS NOTES
Ambulatory Surgery/Procedure Discharge Note    Vitals:    04/05/22 1325   BP: 132/72   Pulse: 57   Resp: 18   Temp: 97.3 °F (36.3 °C)   SpO2: 94%       In: 100 [P.O.:100]  Out: 175 [Urine:150]    Restroom use offered before discharge. yes    Pain assessment:    Pain Level: 4    Pain level tolerable for discharge per patient. Patient discharged to home/self care.  Patient discharged via wheel chair by transporter to waiting family/S.O.       4/5/2022 3:01 PM

## 2022-04-08 ENCOUNTER — TELEPHONE (OUTPATIENT)
Dept: SURGERY | Age: 67
End: 2022-04-08

## 2022-04-08 NOTE — TELEPHONE ENCOUNTER
Pt called back inquiring again before the weekend. I spoke with Georgette Becker, then informed pt that was normal. Advised pt if pain increases and or becomes red to go to the ER. Pt verbalized understanding.

## 2022-04-08 NOTE — TELEPHONE ENCOUNTER
Pt called informs he had hernia surgery on 4/5/22. Wednesday he noticed hard lump in his naval that is tender to touch. Pt is concerned and wants to make sure this normal. Please call pt. [phone].

## 2022-04-13 NOTE — PROGRESS NOTES
PATIENT NAME: Andrew Mills     YOB: 1955     TODAY'S DATE: 2022    Reason for Visit:  Left inguinal hernia    Requesting Physician:  Dr. Gale Reich:              The patient is a 74 y.o. male with a PMHx as delineated below who presents with a bulge in the left groin that has been noted for some time. This has increased in size and more recently has been associated with discomfort with activities. No obstructive complaints.     Chief Complaint   Patient presents with    New Patient     Unilateral Recurrent Inguinal Hernia       REVIEW OF SYSTEMS:  CONSTITUTIONAL:  negative  HEENT:  negative  RESPIRATORY:  negative  CARDIOVASCULAR:  negative  GASTROINTESTINAL:  negative  GENITOURINARY:  negative  HEMATOLOGIC/LYMPHATIC:  negative  MUSCULOSKELETAL: negative  NEUROLOGICAL:  negative    PMH  Past Medical History:   Diagnosis Date    Hypertrophic cardiomegaly     Inguinal hernia     left    PONV (postoperative nausea and vomiting)        PSH  Past Surgical History:   Procedure Laterality Date    INGUINAL HERNIA REPAIR Left 2022    LAPAROSCOPIC BILATERAL INGUINAL HERNIA REPAIR performed by Judith Morrow MD at [address] [place] [address] History  Social History     Socioeconomic History    Marital status:      Spouse name: Not on file    Number of children: Not on file    Years of education: Not on file    Highest education level: Not on file   Occupational History    Not on file   Tobacco Use    Smoking status: Former Smoker     Quit date:      Years since quittin.2    Smokeless tobacco: Never Used   Vaping Use    Vaping Use: Never used   Substance and Sexual Activity    Alcohol use: Yes     Comment: daily beer    Drug use: Never    Sexual activity: Not on file   Other Topics Concern    Not on file   Social History Narrative    Not on file     Social Determinants of Health     Financial Resource Strain:     Difficulty of Paying Living Expenses: Not on file   Food Insecurity:     Worried About [address] in the Last Year: Not on file    Ran Out of Food in the Last Year: Not on file   Transportation Needs:     Lack of Transportation (Medical): Not on file    Lack of Transportation (Non-Medical): Not on file   Physical Activity:     Days of Exercise per Week: Not on file    Minutes of Exercise per Session: Not on file   Stress:     Feeling of Stress : Not on file   Social Connections:     Frequency of Communication with Friends and Family: Not on file    Frequency of Social Gatherings with Friends and Family: Not on file    Attends Oriental orthodox Services: Not on file    Active Member of [address] [place] [address] or Organizations: Not on file    Attends Club or Organization Meetings: Not on file    Marital Status: Not on file   Intimate Partner Violence:     Fear of Current or Ex-Partner: Not on file    Emotionally Abused: Not on file    Physically Abused: Not on file    Sexually Abused: Not on file   Housing Stability:     Unable to Pay for Housing in the Last Year: Not on file    Number of [place] in the Last Year: Not on file    Unstable Housing in the Last Year: Not on file       Family History:   No family history on file.     Allergy:   Allergies   Allergen Reactions    Atorvastatin      Muscle pain  Muscle pain         PHYSICAL EXAM:  VITALS:  /73   Pulse 56   Ht 6' (1.829 m)   Wt 225 lb (102.1 kg)   SpO2 98%   BMI 30.52 kg/m²     CONSTITUTIONAL:  alert, no apparent distress and normal weight  EYES:  sclera clear  ENT:  normocepalic, without obvious abnormality  NECK:  supple, symmetrical, trachea midline and no carotid bruits  LUNGS:  clear to auscultation  CARDIOVASCULAR:  regular rate and rhythm and no murmur noted  ABDOMEN:  no scars, normal bowel sounds, soft, non-distended, non-tender, voluntary guarding absent, no masses palpated and reducible left inguinal hernia with weakness on the right  MUSCULOSKELETAL:  0+ pitting edema lower extremities  NEUROLOGIC:  Mental Status Exam:  Level of Alertness:   awake  Orientation:   person, place, time  SKIN:  no bruising or bleeding and normal skin color, texture, turgor    IMPRESSION/RECOMMENDATIONS:    Patient with a symptomatic right inguinal hernia and possible left for which I have recommended repair. We discussed the surgical options and have elected to proceed with laparoscopic repair. We discussed the risk, benefits, and expected recovery from surgery and he wishes to proceed.       Adan Campa MD

## 2022-04-18 ENCOUNTER — OFFICE VISIT (OUTPATIENT)
Dept: SURGERY | Age: 67
End: 2022-04-18

## 2022-04-18 VITALS
HEIGHT: 72 IN | DIASTOLIC BLOOD PRESSURE: 79 MMHG | HEART RATE: 61 BPM | BODY MASS INDEX: 30.5 KG/M2 | SYSTOLIC BLOOD PRESSURE: 147 MMHG | WEIGHT: 225.2 LBS

## 2022-04-18 DIAGNOSIS — Z87.19 S/P LAPAROSCOPIC HERNIA REPAIR: Primary | ICD-10-CM

## 2022-04-18 DIAGNOSIS — Z98.890 S/P LAPAROSCOPIC HERNIA REPAIR: Primary | ICD-10-CM

## 2022-04-18 PROCEDURE — 99024 POSTOP FOLLOW-UP VISIT: CPT | Performed by: SURGERY

## 2022-04-18 NOTE — PROGRESS NOTES
PATIENT NAME: Eladio Heath     YOB: 1955     TODAY'S DATE: 2022    Reason for Visit:  S/p bilateral inguinal hernia repair    HISTORY OF PRESENT ILLNESS:              The patient is a 74 y.o. male s/p bilateral laparoscopic inguinal hernia repair. Patient denies any complaints. Has been healing well. Chief Complaint   Patient presents with    Post-Op Check     lap.  left inguinal hernia poss right 22       REVIEW OF SYSTEMS:  CONSTITUTIONAL:  negative  HEENT:  negative  RESPIRATORY:  negative  CARDIOVASCULAR:  negative  GASTROINTESTINAL:  negative  GENITOURINARY:  negative  HEMATOLOGIC/LYMPHATIC:  negative  NEUROLOGICAL:  negative    PMH  Past Medical History:   Diagnosis Date    Hypertrophic cardiomegaly     Inguinal hernia     left    PONV (postoperative nausea and vomiting)        PSH  Past Surgical History:   Procedure Laterality Date    INGUINAL HERNIA REPAIR Left 2022    LAPAROSCOPIC BILATERAL INGUINAL HERNIA REPAIR performed by Alejandra Ford MD at [address] [place] [address] History  Social History     Socioeconomic History    Marital status:      Spouse name: Not on file    Number of children: Not on file    Years of education: Not on file    Highest education level: Not on file   Occupational History    Not on file   Tobacco Use    Smoking status: Former Smoker     Quit date:      Years since quittin.3    Smokeless tobacco: Never Used   Vaping Use    Vaping Use: Never used   Substance and Sexual Activity    Alcohol use: Yes     Comment: daily beer    Drug use: Never    Sexual activity: Not on file   Other Topics Concern    Not on file   Social History Narrative    Not on file     Social Determinants of Health     Financial Resource Strain:     Difficulty of Paying Living Expenses: Not on file   Food Insecurity:     Worried About Running Out of Food in the Last Year: Not on file    [place] of Food in the Last Year: Not on file   Transportation Needs:     Lack of Transportation (Medical): Not on file    Lack of Transportation (Non-Medical): Not on file   Physical Activity:     Days of Exercise per Week: Not on file    Minutes of Exercise per Session: Not on file   Stress:     Feeling of Stress : Not on file   Social Connections:     Frequency of Communication with Friends and Family: Not on file    Frequency of Social Gatherings with Friends and Family: Not on file    Attends Jainism Services: Not on file    Active Member of [address] [place] [address] or Organizations: Not on file    Attends Club or Organization Meetings: Not on file    Marital Status: Not on file   Intimate Partner Violence:     Fear of Current or Ex-Partner: Not on file    Emotionally Abused: Not on file    Physically Abused: Not on file    Sexually Abused: Not on file   Housing Stability:     Unable to Pay for Housing in the Last Year: Not on file    Number of [place] in the Last Year: Not on file    Unstable Housing in the Last Year: Not on file       Allergy:   Allergies   Allergen Reactions    Atorvastatin      Muscle pain  Muscle pain         PHYSICAL EXAM:  VITALS:  BP (!) 147/79   Pulse 61   Ht 6' (1.829 m)   Wt 225 lb 3.2 oz (102.2 kg)   BMI 30.54 kg/m²     CONSTITUTIONAL:  alert, no apparent distress and cachectic  EYES:  sclera clear  ENT:  normocepalic, without obvious abnormality  NECK:  supple, symmetrical, trachea midline and no carotid bruits  LUNGS:  clear to auscultation  CARDIOVASCULAR:  regular rate and rhythm and no murmur noted  ABDOMEN:  Soft, non-tender, non-distended, incision well healed, skin glue in place  MUSCULOSKELETAL:  0+ pitting edema lower extremities  NEUROLOGIC:  Mental Status Exam:  Level of Alertness:   awake  Orientation:   person, place, time  SKIN:  no bruising or bleeding    IMPRESSION/RECOMMENDATIONS:    Patient is s/p laparoscopic bilateral inguinal hernia repair. He is recovering well from surgery.  His pain is minimal and he is returning to normal activities. We discussed his continued restrictions and I will see him back in the office on a prn basis. Merry Waller MD PGY-5  04/18/22  3:20 PM  [phone]    I have seen, examined, and reviewed the patients chart. I agree with the residents assessment and have made appropriate changes.     Magdalena uGido

## 2022-06-21 ENCOUNTER — TELEPHONE (OUTPATIENT)
Dept: SURGERY | Age: 67
End: 2022-06-21

## 2022-06-21 NOTE — TELEPHONE ENCOUNTER
Pt feel \"there is something\" in the area of the right groin. Pt had inguinal hernia about 3 months ago. Pt has played golf and feels it from time to time he feels it when driving, There is no pain other then he just feels something. He informs it is like a knot. Please call pt. [phone].

## 2022-06-24 ENCOUNTER — OFFICE VISIT (OUTPATIENT)
Dept: SURGERY | Age: 67
End: 2022-06-24

## 2022-06-24 VITALS
HEIGHT: 72 IN | DIASTOLIC BLOOD PRESSURE: 86 MMHG | BODY MASS INDEX: 30.56 KG/M2 | SYSTOLIC BLOOD PRESSURE: 144 MMHG | WEIGHT: 225.6 LBS | HEART RATE: 49 BPM

## 2022-06-24 DIAGNOSIS — R10.31 RIGHT GROIN PAIN: Primary | ICD-10-CM

## 2022-06-24 PROCEDURE — 99024 POSTOP FOLLOW-UP VISIT: CPT | Performed by: SURGERY

## 2022-06-24 NOTE — PROGRESS NOTES
PATIENT NAME: Bull Mcarthur     YOB: 1955     TODAY'S DATE: 2022    Reason for Visit:  S/p bilateral inguinal hernia repair    HISTORY OF PRESENT ILLNESS:              The patient is a 74 y.o. male s/p bilateral laparoscopic inguinal hernia repair. He noted recent right groin pain during a round of golf. This has improved since.      Chief Complaint   Patient presents with    Post-Op Check     s/p bilateral laparoscopic inguinal hernia 22       REVIEW OF SYSTEMS:  CONSTITUTIONAL:  negative  HEENT:  negative  RESPIRATORY:  negative  CARDIOVASCULAR:  negative  GASTROINTESTINAL:  negative  GENITOURINARY:  negative  HEMATOLOGIC/LYMPHATIC:  negative  NEUROLOGICAL:  negative    PMH  Past Medical History:   Diagnosis Date    Hypertrophic cardiomegaly     Inguinal hernia     left    PONV (postoperative nausea and vomiting)        PSH  Past Surgical History:   Procedure Laterality Date    INGUINAL HERNIA REPAIR Left 2022    LAPAROSCOPIC BILATERAL INGUINAL HERNIA REPAIR performed by Neeraj Ralph MD at [address] [place] [address] El History  Social History     Socioeconomic History    Marital status:      Spouse name: Not on file    Number of children: Not on file    Years of education: Not on file    Highest education level: Not on file   Occupational History    Not on file   Tobacco Use    Smoking status: Former Smoker     Quit date:      Years since quittin.5    Smokeless tobacco: Never Used   Vaping Use    Vaping Use: Never used   Substance and Sexual Activity    Alcohol use: Yes     Comment: daily beer    Drug use: Never    Sexual activity: Not on file   Other Topics Concern    Not on file   Social History Narrative    Not on file     Social Determinants of Health     Financial Resource Strain:     Difficulty of Paying Living Expenses: Not on file   Food Insecurity:     Worried About Running Out of Food in the Last Year: Not on file    Ran Out of Food in the Last Year: Not on file   Transportation Needs:     Lack of Transportation (Medical): Not on file    Lack of Transportation (Non-Medical): Not on file   Physical Activity:     Days of Exercise per Week: Not on file    Minutes of Exercise per Session: Not on file   Stress:     Feeling of Stress : Not on file   Social Connections:     Frequency of Communication with Friends and Family: Not on file    Frequency of Social Gatherings with Friends and Family: Not on file    Attends Druze Services: Not on file    Active Member of [address] [place] [address] or Organizations: Not on file    Attends Club or Organization Meetings: Not on file    Marital Status: Not on file   Intimate Partner Violence:     Fear of Current or Ex-Partner: Not on file    Emotionally Abused: Not on file    Physically Abused: Not on file    Sexually Abused: Not on file   Housing Stability:     Unable to Pay for Housing in the Last Year: Not on file    Number of [place] in the Last Year: Not on file    Unstable Housing in the Last Year: Not on file       Allergy:   Allergies   Allergen Reactions    Atorvastatin      Muscle pain  Muscle pain         PHYSICAL EXAM:  VITALS:  BP (!) 144/86   Pulse (!) 49   Ht 6' (1.829 m)   Wt 225 lb 9.6 oz (102.3 kg)   BMI 30.60 kg/m²     CONSTITUTIONAL:  alert, no apparent distress and cachectic  EYES:  sclera clear  ENT:  normocepalic, without obvious abnormality  NECK:  supple, symmetrical, trachea midline and no carotid bruits  LUNGS:  clear to auscultation  CARDIOVASCULAR:  regular rate and rhythm and no murmur noted  ABDOMEN:  Soft, non-tender, non-distended, incision well healed, skin glue in place  MUSCULOSKELETAL:  0+ pitting edema lower extremities  NEUROLOGIC:  Mental Status Exam:  Level of Alertness:   awake  Orientation:   person, place, time  SKIN:  no bruising or bleeding    IMPRESSION/RECOMMENDATIONS:    Patient is s/p laparoscopic bilateral inguinal hernia repair.  He is recovering well from surgery. His pain is most likely a mild strain. No recurrence noted. All questions answered. I will see him back in the office on a prn basis.     Damon Bond  06/30/22  6:45 AM  [phone]

## 2024-02-03 ENCOUNTER — HOSPITAL ENCOUNTER (EMERGENCY)
Age: 69
Discharge: HOME | End: 2024-02-04
Attending: EMERGENCY MEDICINE
Payer: MEDICARE

## 2024-02-03 DIAGNOSIS — T39.395A NSAID INDUCED GASTRITIS: Primary | ICD-10-CM

## 2024-02-03 DIAGNOSIS — K29.60 NSAID INDUCED GASTRITIS: Primary | ICD-10-CM

## 2024-02-03 LAB
BASOPHILS # BLD: 0 K/UL (ref 0–0.2)
BASOPHILS NFR BLD: 0.2 %
DEPRECATED RDW RBC AUTO: 13.6 % (ref 12.4–15.4)
EOSINOPHIL # BLD: 0.5 K/UL (ref 0–0.6)
EOSINOPHIL NFR BLD: 5.6 %
HCT VFR BLD AUTO: 39.9 % (ref 40.5–52.5)
HGB BLD-MCNC: 14.2 G/DL (ref 13.5–17.5)
LYMPHOCYTES # BLD: 1.7 K/UL (ref 1–5.1)
LYMPHOCYTES NFR BLD: 18.7 %
MCH RBC QN AUTO: 30.3 PG (ref 26–34)
MCHC RBC AUTO-ENTMCNC: 35.5 G/DL (ref 31–36)
MCV RBC AUTO: 85.2 FL (ref 80–100)
MONOCYTES # BLD: 0.7 K/UL (ref 0–1.3)
MONOCYTES NFR BLD: 7.9 %
NEUTROPHILS # BLD: 6.2 K/UL (ref 1.7–7.7)
NEUTROPHILS NFR BLD: 67.6 %
PLATELET # BLD AUTO: 174 K/UL (ref 135–450)
PMV BLD AUTO: 7.6 FL (ref 5–10.5)
RBC # BLD AUTO: 4.68 M/UL (ref 4.2–5.9)
WBC # BLD AUTO: 9.2 K/UL (ref 4–11)

## 2024-02-03 PROCEDURE — 85025 COMPLETE CBC W/AUTO DIFF WBC: CPT

## 2024-02-03 PROCEDURE — 84484 ASSAY OF TROPONIN QUANT: CPT

## 2024-02-03 PROCEDURE — 83690 ASSAY OF LIPASE: CPT

## 2024-02-03 PROCEDURE — 80053 COMPREHEN METABOLIC PANEL: CPT

## 2024-02-03 PROCEDURE — 99283 EMERGENCY DEPT VISIT LOW MDM: CPT

## 2024-02-03 PROCEDURE — 93005 ELECTROCARDIOGRAM TRACING: CPT | Performed by: EMERGENCY MEDICINE

## 2024-02-03 ASSESSMENT — PAIN DESCRIPTION - LOCATION: LOCATION: ABDOMEN

## 2024-02-03 ASSESSMENT — PAIN - FUNCTIONAL ASSESSMENT: PAIN_FUNCTIONAL_ASSESSMENT: ACTIVITIES ARE NOT PREVENTED

## 2024-02-03 ASSESSMENT — PAIN DESCRIPTION - ORIENTATION: ORIENTATION: UPPER

## 2024-02-03 ASSESSMENT — PAIN DESCRIPTION - FREQUENCY: FREQUENCY: CONTINUOUS

## 2024-02-03 ASSESSMENT — PAIN SCALES - GENERAL: PAINLEVEL_OUTOF10: 5

## 2024-02-03 ASSESSMENT — PAIN DESCRIPTION - PAIN TYPE: TYPE: ACUTE PAIN

## 2024-02-03 ASSESSMENT — LIFESTYLE VARIABLES
HOW OFTEN DO YOU HAVE A DRINK CONTAINING ALCOHOL: NEVER
HOW MANY STANDARD DRINKS CONTAINING ALCOHOL DO YOU HAVE ON A TYPICAL DAY: PATIENT DOES NOT DRINK

## 2024-02-04 VITALS
SYSTOLIC BLOOD PRESSURE: 148 MMHG | TEMPERATURE: 97 F | OXYGEN SATURATION: 95 % | BODY MASS INDEX: 31.11 KG/M2 | RESPIRATION RATE: 18 BRPM | DIASTOLIC BLOOD PRESSURE: 80 MMHG | HEART RATE: 79 BPM | WEIGHT: 229.7 LBS | HEIGHT: 72 IN

## 2024-02-04 LAB
ALBUMIN SERPL-MCNC: 4.2 G/DL (ref 3.4–5)
ALBUMIN/GLOB SERPL: 1.6 {RATIO} (ref 1.1–2.2)
ALP SERPL-CCNC: 38 U/L (ref 40–129)
ALT SERPL-CCNC: 14 U/L (ref 10–40)
ANION GAP SERPL CALCULATED.3IONS-SCNC: 12 MMOL/L (ref 3–16)
AST SERPL-CCNC: 18 U/L (ref 15–37)
BILIRUB SERPL-MCNC: 0.4 MG/DL (ref 0–1)
BUN SERPL-MCNC: 19 MG/DL (ref 7–20)
CALCIUM SERPL-MCNC: 9.6 MG/DL (ref 8.3–10.6)
CHLORIDE SERPL-SCNC: 100 MMOL/L (ref 99–110)
CO2 SERPL-SCNC: 25 MMOL/L (ref 21–32)
CREAT SERPL-MCNC: 0.9 MG/DL (ref 0.8–1.3)
EKG ATRIAL RATE: 58 BPM
EKG DIAGNOSIS: NORMAL
EKG P AXIS: 59 DEGREES
EKG P-R INTERVAL: 190 MS
EKG Q-T INTERVAL: 464 MS
EKG QRS DURATION: 100 MS
EKG QTC CALCULATION (BAZETT): 455 MS
EKG R AXIS: 58 DEGREES
EKG T AXIS: 156 DEGREES
EKG VENTRICULAR RATE: 58 BPM
GFR SERPLBLD CREATININE-BSD FMLA CKD-EPI: >60 ML/MIN/{1.73_M2}
GLUCOSE SERPL-MCNC: 138 MG/DL (ref 70–99)
LIPASE SERPL-CCNC: 21 U/L (ref 13–60)
POTASSIUM SERPL-SCNC: 3.8 MMOL/L (ref 3.5–5.1)
PROT SERPL-MCNC: 6.8 G/DL (ref 6.4–8.2)
SODIUM SERPL-SCNC: 137 MMOL/L (ref 136–145)
TROPONIN, HIGH SENSITIVITY: 18 NG/L (ref 0–22)
TROPONIN, HIGH SENSITIVITY: 19 NG/L (ref 0–22)

## 2024-02-04 PROCEDURE — 6370000000 HC RX 637 (ALT 250 FOR IP)

## 2024-02-04 PROCEDURE — 84484 ASSAY OF TROPONIN QUANT: CPT

## 2024-02-04 RX ORDER — PANTOPRAZOLE SODIUM 40 MG/1
40 TABLET, DELAYED RELEASE ORAL
Qty: 30 TABLET | Refills: 0 | Status: SHIPPED | OUTPATIENT
Start: 2024-02-04

## 2024-02-04 RX ADMIN — ALUMINUM HYDROXIDE, MAGNESIUM HYDROXIDE, AND SIMETHICONE: 200; 200; 20 SUSPENSION ORAL at 00:05

## 2024-02-04 NOTE — DISCHARGE INSTRUCTIONS
-please avoid ibuprofen for pain control as much as possible   -start taking Protonix once daily  -please return to the ED if your pain gets worse or returns   -please follow up with your PCP within 1 week of discharge

## 2024-02-04 NOTE — ED PROVIDER NOTES
ED Attending Attestation Note     Date of evaluation: 2/3/2024    This patient was seen by the resident.  I have seen and examined the patient, agree with the workup, evaluation, management and diagnosis. The care plan has been discussed.  I have reviewed the ECG and concur with the resident's interpretation.  My assessment reveals patient presents for patient has pain in the mid abdomen that radiates into the epigastric region.  He states he has had similar symptoms in the past.  Patient does note he has been taking ibuprofen for the last several days for shoulder pain.  On arrival, patient is mildly hypertensive but otherwise hemodynamically stable.  He has clear breath sounds and normal heart sounds.  He does have mild epigastric tenderness on exam with no rebound or guarding.  Laboratory studies are unremarkable including serial troponins.  EKG does show T wave inversions and biphasic T waves in the inferior and precordial leads but this appears similar to an EKG from 2 years ago.  Patient was given GI cocktail with improvement of his symptoms.  I feel most likely patient's symptoms are secondary to NSAID induced gastritis.  I have low suspicion for cardiac disease as a cause of the patient's symptoms since is not exertional and he has negative troponins and stable EKGs I do not feel any further cardiac risk stratification is indicated.  Patient will be started on a PPI and will be instructed to follow-up with his primary care provider for repeat evaluation.  I did also recommend he refrain from using NSAIDs and instead use Tylenol for pain.    Medical Decision Making  Problems Addressed:  NSAID induced gastritis: acute illness or injury    Amount and/or Complexity of Data Reviewed  Labs: ordered. Decision-making details documented in ED Course.    Risk  Prescription drug management.    Clinical pression:  1.  NSAID induced gastritis     Papa Mejía MD  02/04/24 0141    
Medical Hx, Past Surgical Hx, Social Hx, Allergies, and FamilyHx were reviewed.         The patient was given the following medications:  Orders Placed This Encounter   Medications    aluminum & magnesium hydroxide-simethicone (MAALOX) 30 mL, lidocaine viscous hcl (XYLOCAINE) 5 mL (GI COCKTAIL)    pantoprazole (PROTONIX) 40 MG tablet     Sig: Take 1 tablet by mouth every morning (before breakfast)     Dispense:  30 tablet     Refill:  0       CONSULTS:  None    Review of Systems   Constitutional:  Negative for activity change, appetite change, chills and fatigue.   Respiratory:  Negative for apnea, cough and shortness of breath.    Cardiovascular:  Negative for chest pain and leg swelling.   Gastrointestinal:  Positive for abdominal pain and nausea. Negative for abdominal distention, blood in stool, constipation, diarrhea and vomiting.   Genitourinary:  Negative for decreased urine volume, difficulty urinating, dysuria and urgency.   Musculoskeletal:  Negative for arthralgias.   Neurological:  Negative for dizziness.       Past Medical, Surgical, Family, and Social History     He has a past medical history of Hypertrophic cardiomegaly, Inguinal hernia, and PONV (postoperative nausea and vomiting).  He has a past surgical history that includes knee surgery and Inguinal hernia repair (Left, 4/5/2022).  His family history is not on file.  He reports that he quit smoking about 22 years ago. He has never used smokeless tobacco. He reports current alcohol use. He reports that he does not use drugs.    Medications     Previous Medications    ASPIRIN 81 MG EC TABLET    Take 1 tablet by mouth daily    METOPROLOL SUCCINATE (TOPROL XL) 25 MG EXTENDED RELEASE TABLET    TAKE ONE TABLET BY MOUTH DAILY    TADALAFIL (CIALIS) 20 MG TABLET    Take 20 mg by mouth daily as needed       Allergies     He is allergic to atorvastatin.    Physical Exam     INITIAL VITALS: BP: (!) 182/93, Temp: 97 °F (36.1 °C), Pulse: 55, Respirations: 18,

## (undated) DEVICE — KIT,ANTI FOG,W/SPONGE & FLUID,SOFT PACK: Brand: MEDLINE

## (undated) DEVICE — TOWEL,STOP FLAG GOLD N-W: Brand: MEDLINE

## (undated) DEVICE — CORD ES L10FT MPLR LAP

## (undated) DEVICE — TROCAR: Brand: KII SHIELDED BLADED ACCESS SYSTEM

## (undated) DEVICE — SUTURE MCRYL SZ 4-0 L18IN ABSRB UD L19MM PS-2 3/8 CIR PRIM Y496G

## (undated) DEVICE — TROCARS: Brand: KII® BALLOON BLUNT TIP SYSTEM

## (undated) DEVICE — E-Z CLEAN, NON-STICK, PTFE COATED, ELECTROSURGICAL BLADE ELECTRODE, MODIFIED EXTENDED INSULATION, 2.5 INCH (6.35 CM): Brand: MEGADYNE

## (undated) DEVICE — GLOVE SURG SZ 7 L12IN FNGR THK75MIL WHT LTX POLYMER BEAD

## (undated) DEVICE — APPLICATOR PREP 26ML 0.7% IOD POVACRYLEX 74% ISO ALC ST

## (undated) DEVICE — SUTURE VCRL SZ 0 L27IN ABSRB UD L26MM CT-2 1/2 CIR J270H

## (undated) DEVICE — PUMP SUC IRR TBNG L10FT W/ HNDPC ASSEMB STRYKEFLOW 2

## (undated) DEVICE — GENERAL LAPAROSCOPIC: Brand: MEDLINE INDUSTRIES, INC.